# Patient Record
Sex: FEMALE | Race: WHITE | NOT HISPANIC OR LATINO | URBAN - METROPOLITAN AREA
[De-identification: names, ages, dates, MRNs, and addresses within clinical notes are randomized per-mention and may not be internally consistent; named-entity substitution may affect disease eponyms.]

---
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
## 2020-06-17 NOTE — ED PEDIATRIC NURSE NOTE - NS ED NURSE IV DC DT
- renal function is stable around baseline  - Your parathyroid hormone level is at target which is good ( the hormone that controls bone health and can be altered with renal disease)  - your hemoglobin levels are normal ( the measure of the strength of you
Result released to my chart.
Results sent to patient via my chart.
Sent to [facility].
28-Nov-2017 00:17

## 2020-06-22 ENCOUNTER — APPOINTMENT (OUTPATIENT)
Dept: ORTHOPEDIC SURGERY | Facility: CLINIC | Age: 20
End: 2020-06-22
Payer: COMMERCIAL

## 2020-06-22 VITALS — TEMPERATURE: 98.6 F

## 2020-06-22 VITALS — HEART RATE: 99 BPM | DIASTOLIC BLOOD PRESSURE: 81 MMHG | SYSTOLIC BLOOD PRESSURE: 123 MMHG

## 2020-06-22 DIAGNOSIS — M54.12 RADICULOPATHY, CERVICAL REGION: ICD-10-CM

## 2020-06-22 PROCEDURE — 72040 X-RAY EXAM NECK SPINE 2-3 VW: CPT

## 2020-06-22 PROCEDURE — 99214 OFFICE O/P EST MOD 30 MIN: CPT

## 2020-06-22 RX ORDER — CYCLOBENZAPRINE HYDROCHLORIDE 10 MG/1
10 TABLET, FILM COATED ORAL 3 TIMES DAILY
Qty: 60 | Refills: 0 | Status: ACTIVE | COMMUNITY
Start: 2020-06-22 | End: 1900-01-01

## 2020-06-22 RX ORDER — METHYLPREDNISOLONE 4 MG/1
4 TABLET ORAL
Qty: 1 | Refills: 0 | Status: ACTIVE | COMMUNITY
Start: 2020-06-22 | End: 1900-01-01

## 2020-06-22 NOTE — DISCUSSION/SUMMARY
[de-identified] : Given her progressive cervical radiculopathy an MRI of her cervical spine will be obtained.  We will also try Medrol Dosepak and Flexeril.  Follow-up after the MRI.

## 2020-06-22 NOTE — HISTORY OF PRESENT ILLNESS
[de-identified] : Ms. JABARI BERMUDEZ is a 19 year female who presents to office complaining of neck pain\par Pain is insidious in onset x 1 week without specific injury, trauma, or fall.\par She has history of asthma and she says that if she has an asthma attack, her neck will stiffen up and cause pain.\par Her pain is mainly resided in the neck, but at times radiates down her right arm into her elbow mainly, but at times into her fingers.\par She denies numbness or tingling sensations.\par Tylenol, Advil, and ice have been tried, with ice helping the most.\par She has not previously been evaluated for this problem by a medical professional.\par All review of systems, family history, social history, surgical history, past medical history, medications, and allergies not previously stated as positive are negative. They were reviewed by me today with the patient and documented accordingly.

## 2020-06-22 NOTE — PHYSICAL EXAM
[de-identified] : Examination of the cervical spine reveals no midline or paraspinal tenderness to palpation. No cervical lymphadenopathy. Decreased range of motion with respect to flexion, extension, rotation, and lateral bending.  Positive Spurlings. Negative Lhermitte's. Full range of motion bilateral shoulders without evidence of impingement. No instability of bilateral upper extremities.  Cranial nerves II through XII grossly intact. Intact sensation bilateral upper extremities. 5/5 deltoids biceps triceps wrist extensors wrist flexors finger flexors and hand intrinsics. 1+ biceps triceps and brachioradialis reflexes. Negative Lazar's. 2+ radial pulse. Negative Tinel's over the cubital and carpal tunnel. No skin lesions on the right and left upper extremities. [Ataxic] : not ataxic [de-identified] : AP lateral cervical x-rays reveals loss of cervical lordosis and some spondylosis

## 2020-07-06 ENCOUNTER — INPATIENT (INPATIENT)
Facility: HOSPITAL | Age: 20
LOS: 2 days | Discharge: HOME | DRG: 392 | End: 2020-07-09
Attending: INTERNAL MEDICINE | Admitting: HOSPITALIST
Payer: COMMERCIAL

## 2020-07-06 ENCOUNTER — APPOINTMENT (OUTPATIENT)
Dept: GASTROENTEROLOGY | Facility: CLINIC | Age: 20
End: 2020-07-06
Payer: COMMERCIAL

## 2020-07-06 VITALS
TEMPERATURE: 99 F | DIASTOLIC BLOOD PRESSURE: 69 MMHG | WEIGHT: 160.06 LBS | OXYGEN SATURATION: 100 % | HEIGHT: 63 IN | SYSTOLIC BLOOD PRESSURE: 105 MMHG | RESPIRATION RATE: 19 BRPM | HEART RATE: 103 BPM

## 2020-07-06 VITALS
OXYGEN SATURATION: 98 % | BODY MASS INDEX: 30.12 KG/M2 | SYSTOLIC BLOOD PRESSURE: 120 MMHG | WEIGHT: 170 LBS | DIASTOLIC BLOOD PRESSURE: 80 MMHG | HEART RATE: 99 BPM | HEIGHT: 63 IN

## 2020-07-06 VITALS — TEMPERATURE: 98.2 F

## 2020-07-06 DIAGNOSIS — R13.10 DYSPHAGIA, UNSPECIFIED: ICD-10-CM

## 2020-07-06 LAB
ALBUMIN SERPL ELPH-MCNC: 4.6 G/DL — SIGNIFICANT CHANGE UP (ref 3.3–5)
ALP SERPL-CCNC: 91 U/L — SIGNIFICANT CHANGE UP (ref 40–120)
ALT FLD-CCNC: 12 U/L — SIGNIFICANT CHANGE UP (ref 10–45)
ANION GAP SERPL CALC-SCNC: 16 MMOL/L — SIGNIFICANT CHANGE UP (ref 5–17)
APTT BLD: 29.3 SEC — SIGNIFICANT CHANGE UP (ref 27.5–35.5)
AST SERPL-CCNC: 14 U/L — SIGNIFICANT CHANGE UP (ref 10–40)
BASOPHILS # BLD AUTO: 0.05 K/UL — SIGNIFICANT CHANGE UP (ref 0–0.2)
BASOPHILS NFR BLD AUTO: 0.5 % — SIGNIFICANT CHANGE UP (ref 0–2)
BILIRUB SERPL-MCNC: 0.2 MG/DL — SIGNIFICANT CHANGE UP (ref 0.2–1.2)
BLD GP AB SCN SERPL QL: NEGATIVE — SIGNIFICANT CHANGE UP
BUN SERPL-MCNC: 10 MG/DL — SIGNIFICANT CHANGE UP (ref 7–23)
CALCIUM SERPL-MCNC: 9.8 MG/DL — SIGNIFICANT CHANGE UP (ref 8.4–10.5)
CHLORIDE SERPL-SCNC: 102 MMOL/L — SIGNIFICANT CHANGE UP (ref 96–108)
CO2 SERPL-SCNC: 20 MMOL/L — LOW (ref 22–31)
CREAT SERPL-MCNC: 0.91 MG/DL — SIGNIFICANT CHANGE UP (ref 0.5–1.3)
EOSINOPHIL # BLD AUTO: 0.02 K/UL — SIGNIFICANT CHANGE UP (ref 0–0.5)
EOSINOPHIL NFR BLD AUTO: 0.2 % — SIGNIFICANT CHANGE UP (ref 0–6)
GLUCOSE SERPL-MCNC: 80 MG/DL — SIGNIFICANT CHANGE UP (ref 70–99)
HCG SERPL-ACNC: <2 MIU/ML — SIGNIFICANT CHANGE UP
HCT VFR BLD CALC: 38.8 % — SIGNIFICANT CHANGE UP (ref 34.5–45)
HGB BLD-MCNC: 12.8 G/DL — SIGNIFICANT CHANGE UP (ref 11.5–15.5)
IMM GRANULOCYTES NFR BLD AUTO: 0.3 % — SIGNIFICANT CHANGE UP (ref 0–1.5)
INR BLD: 1.12 RATIO — SIGNIFICANT CHANGE UP (ref 0.88–1.16)
LYMPHOCYTES # BLD AUTO: 2.22 K/UL — SIGNIFICANT CHANGE UP (ref 1–3.3)
LYMPHOCYTES # BLD AUTO: 23.9 % — SIGNIFICANT CHANGE UP (ref 13–44)
MCHC RBC-ENTMCNC: 27.4 PG — SIGNIFICANT CHANGE UP (ref 27–34)
MCHC RBC-ENTMCNC: 33 GM/DL — SIGNIFICANT CHANGE UP (ref 32–36)
MCV RBC AUTO: 83.1 FL — SIGNIFICANT CHANGE UP (ref 80–100)
MONOCYTES # BLD AUTO: 0.45 K/UL — SIGNIFICANT CHANGE UP (ref 0–0.9)
MONOCYTES NFR BLD AUTO: 4.8 % — SIGNIFICANT CHANGE UP (ref 2–14)
NEUTROPHILS # BLD AUTO: 6.51 K/UL — SIGNIFICANT CHANGE UP (ref 1.8–7.4)
NEUTROPHILS NFR BLD AUTO: 70.3 % — SIGNIFICANT CHANGE UP (ref 43–77)
NRBC # BLD: 0 /100 WBCS — SIGNIFICANT CHANGE UP (ref 0–0)
PLATELET # BLD AUTO: 391 K/UL — SIGNIFICANT CHANGE UP (ref 150–400)
POTASSIUM SERPL-MCNC: 3.6 MMOL/L — SIGNIFICANT CHANGE UP (ref 3.5–5.3)
POTASSIUM SERPL-SCNC: 3.6 MMOL/L — SIGNIFICANT CHANGE UP (ref 3.5–5.3)
PROT SERPL-MCNC: 7.7 G/DL — SIGNIFICANT CHANGE UP (ref 6–8.3)
PROTHROM AB SERPL-ACNC: 12.8 SEC — SIGNIFICANT CHANGE UP (ref 10.6–13.6)
RBC # BLD: 4.67 M/UL — SIGNIFICANT CHANGE UP (ref 3.8–5.2)
RBC # FLD: 13 % — SIGNIFICANT CHANGE UP (ref 10.3–14.5)
RH IG SCN BLD-IMP: NEGATIVE — SIGNIFICANT CHANGE UP
RH IG SCN BLD-IMP: NEGATIVE — SIGNIFICANT CHANGE UP
SARS-COV-2 RNA SPEC QL NAA+PROBE: SIGNIFICANT CHANGE UP
SODIUM SERPL-SCNC: 138 MMOL/L — SIGNIFICANT CHANGE UP (ref 135–145)
WBC # BLD: 9.28 K/UL — SIGNIFICANT CHANGE UP (ref 3.8–10.5)
WBC # FLD AUTO: 9.28 K/UL — SIGNIFICANT CHANGE UP (ref 3.8–10.5)

## 2020-07-06 PROCEDURE — 71046 X-RAY EXAM CHEST 2 VIEWS: CPT | Mod: 26

## 2020-07-06 PROCEDURE — 99285 EMERGENCY DEPT VISIT HI MDM: CPT

## 2020-07-06 PROCEDURE — 99204 OFFICE O/P NEW MOD 45 MIN: CPT

## 2020-07-06 PROCEDURE — 99223 1ST HOSP IP/OBS HIGH 75: CPT

## 2020-07-06 RX ORDER — SODIUM CHLORIDE 9 MG/ML
1000 INJECTION, SOLUTION INTRAVENOUS ONCE
Refills: 0 | Status: COMPLETED | OUTPATIENT
Start: 2020-07-06 | End: 2020-07-06

## 2020-07-06 RX ADMIN — SODIUM CHLORIDE 1000 MILLILITER(S): 9 INJECTION, SOLUTION INTRAVENOUS at 20:51

## 2020-07-06 NOTE — ED CLERICAL - NS ED CLERK NOTE PRE-ARRIVAL INFORMATION; ADDITIONAL PRE-ARRIVAL INFORMATION
CC/Reason For referral: difficulty swallowing x 3 days  Preferred Consultant(if applicable): [facility]  Who admits for you (if needed): Hospitalist  Do you have documents you would like to fax over? Note in [employer]  Would you still like to speak to an ED attending? can call once patient is seen and evaluated

## 2020-07-06 NOTE — H&P ADULT - ASSESSMENT
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

## 2020-07-06 NOTE — ED PROVIDER NOTE - CLINICAL SUMMARY MEDICAL DECISION MAKING FREE TEXT BOX
19F pmh carole, CRPS p/w 3 days of inability to swallow liquids or solids. States every time she eats or drinks, she immediately vomits and feels pain on lower right side of chest. Able to swallow saliva. Pt went to Gastroenterologist who referred pt here for endoscopy.  Pt in NAD, Speaking clearly, no abd ttp.  given tachycardia, patient likely dehydrated from lack of PO intake.  Concern for possible food impaction vs achalasia.  Will check labs, give IV hydration and plan for admission.  - Amairani Huerta, DO

## 2020-07-06 NOTE — ED ADULT TRIAGE NOTE - CHIEF COMPLAINT QUOTE
patient c/o pain/ "feeling of something getting stuck" after eating or drinking x3days  seen by DARLYN Wynn and sent in for further workup / endoscopy   denies diff breathing/swallowing

## 2020-07-06 NOTE — H&P ADULT - NSICDXPASTMEDICALHX_GEN_ALL_CORE_FT
PAST MEDICAL HISTORY:  Anxiety     PANDAS (pediatric autoimmune neuropsychiatric disease associated with streptococcal infection)

## 2020-07-06 NOTE — H&P ADULT - ATTENDING COMMENTS
Patient was previously unknown to me. Patient was assigned to me by hospitalist in charge. My involvement in this case consisted only of the initial history, physical and management plan. Primary medicine day team to assume care in AM and thereafter. Case discussed in detail with overnight medicine NP/PA Patient was previously unknown to me. Patient was assigned to me by hospitalist in charge. My involvement in this case consisted only of the initial history, physical and management plan. Primary medicine day team to assume care in AM and thereafter. Case discussed in detail with overnight medicine NP/RADHA Mata [id number]

## 2020-07-06 NOTE — H&P ADULT - PROBLEM SELECTOR PLAN 2
Improved HR during assessment HR in 80s -110s with about 700 cc of LR infused  Likely in setting of dehydration  Monitor vitals

## 2020-07-06 NOTE — H&P ADULT - PROBLEM SELECTOR PLAN 3
IMPROVE score = 0  Encourage ambulation Patient on Prozac and Topamax at home  Will hold for now given inability to tolerate PO at this time

## 2020-07-06 NOTE — H&P ADULT - NSHPREVIEWOFSYSTEMS_GEN_ALL_CORE
REVIEW OF SYSTEMS:  CONSTITUTIONAL: See HPI  EYES: No eye pain or discharge  ENMT: No sinus or throat pain  RESPIRATORY: No cough or shortness of breath  CARDIOVASCULAR: No chest pain, palpitations, or leg swelling  GASTROINTESTINAL: See HPI  GENITOURINARY: No dysuria or change of frequency  NEUROLOGICAL: No headaches, loss of strength, numbness, or tremors  SKIN: No rashes or lesions   LYMPH NODES: No enlarged glands  ENDOCRINE: No heat or cold intolerance; No hair loss  MUSCULOSKELETAL: No joint pain or swelling; No muscle, back, or extremity pain  PSYCHIATRIC: No depression or anxiety  ALLERGY AND IMMUNOLOGIC: +Reaction to medications: see allergy list

## 2020-07-06 NOTE — H&P ADULT - PROBLEM SELECTOR PLAN 1
Dysphagia with component of odynophagia  Etiology includes but not limited to anatomical pathology (i.e stricture vs esophageal stricture) vs neurological related Dysphagia with component of odynophagia  Etiology includes but not limited to anatomical pathology, i.e stricture vs esophageal stricture vs achalasia vs esophagitis  NPO for now  Maintenance fluids  Check Depakote levels  Will convert Depakote to IV as patient states she was prescribed medication to control vomiting syndrome 2 years ago. (Discussed IV dosing with overnight pharmacist)

## 2020-07-06 NOTE — H&P ADULT - NSHPLABSRESULTS_GEN_ALL_CORE
Personally reviewed labs and noted in detail below Personally reviewed labs and noted in detail below:  no leukocytosis    Personally reviewed EKG: Sinus Tach 124 bpm QTc 439 no ST segment changes    Personally reviewed CXR: no appreciable focal consolidations

## 2020-07-06 NOTE — H&P ADULT - NSHPPHYSICALEXAM_GEN_ALL_CORE
Vital Signs Last 24 Hrs  T(C): 36.8 (06 Jul 2020 20:58), Max: 37.2 (06 Jul 2020 17:05)  T(F): 98.2 (06 Jul 2020 20:58), Max: 98.9 (06 Jul 2020 17:05)  HR: 117 (06 Jul 2020 20:58) (103 - 120)  BP: 112/79 (06 Jul 2020 20:58) (105/69 - 112/79)  BP(mean): --  RR: 18 (06 Jul 2020 20:58) (18 - 19)  SpO2: 100% (06 Jul 2020 20:58) (100% - 100%)    PHYSICAL EXAM:  GENERAL: NAD, well-groomed, well-developed  HEAD:  Atraumatic, Normocephalic  EYES: EOMI, PERRLA, conjunctiva and sclera clear  NECK: Supple, No JVD  NERVOUS SYSTEM:  Alert & Oriented X3, Good concentration; Motor Strength 5/5 B/L upper and lower extremities  CHEST/LUNG: Clear to percussion bilaterally; No rales, rhonchi, or wheezing  HEART: On monitor HR range from 80s-110s. Regular rhythm + S1 S2; No murmurs, rubs, or gallops  ABDOMEN: Soft, Nontender, Nondistended; Bowel sounds present  EXTREMITIES:  2+ Radial and PT pulses. No clubbing, cyanosis, or edema  LYMPH: No cervical lymphadenopathy noted  SKIN: Warm and dry. No rashes or lesions

## 2020-07-06 NOTE — H&P ADULT - PROBLEM SELECTOR PROBLEM 2
PANDAS (pediatric autoimmune neuropsychiatric disease associated with streptococcal infection) Sinus tachycardia

## 2020-07-06 NOTE — ED PROVIDER NOTE - OBJECTIVE STATEMENT
18 y/o F with pmhx of pandas CRPS presents to the ED c/o x3 days of inability to swallow liquids or solids. States every time she eats or drinks, she immediately vomits and feel pain on lower right side of chest. Able to swallow saliva. Pt went to Gastroenterologist who referred pt here for endoscopy. Denies sob, abd pain, diarrhea, or constipation. 18 y/o F with pmhx of carole, CRPS presents to the ED c/o x3 days of inability to swallow liquids or solids. States every time she eats or drinks, she immediately vomits and feel pain on lower right side of chest. Able to swallow saliva. Pt went to Gastroenterologist who referred pt here for endoscopy. Denies sob, abd pain, diarrhea, or constipation.

## 2020-07-06 NOTE — PHYSICAL EXAM
[General Appearance - Alert] : alert [General Appearance - In No Acute Distress] : in no acute distress [General Appearance - Well Nourished] : well nourished [General Appearance - Well Developed] : well developed [General Appearance - Well-Appearing] : healthy appearing [Neck Appearance] : the appearance of the neck was normal [Neck Cervical Mass (___cm)] : no neck mass was observed [Jugular Venous Distention Increased] : there was no jugular-venous distention [Thyroid Nodule] : there were no palpable thyroid nodules [Thyroid Diffuse Enlargement] : the thyroid was not enlarged [Auscultation Breath Sounds / Voice Sounds] : lungs were clear to auscultation bilaterally [Heart Rate And Rhythm] : heart rate was normal and rhythm regular [Heart Sounds] : normal S1 and S2 [Heart Sounds Gallop] : no gallops [Murmurs] : no murmurs [Bowel Sounds] : normal bowel sounds [Heart Sounds Pericardial Friction Rub] : no pericardial rub [Abdomen Soft] : soft [Abdomen Tenderness] : non-tender [] : no hepato-splenomegaly [Abdomen Mass (___ Cm)] : no abdominal mass palpated

## 2020-07-06 NOTE — H&P ADULT - HISTORY OF PRESENT ILLNESS
18 yo woman with PMH of PANDAS, CRPS presents from gastroenterologist office in the setting of inability to swallow solids and liquids with resulting vomiting the past 3 days. Patient states that when she swallows she feels a sharp discomfort in the right/middle part of the chest. Sometimes it feels like there is a "stuck" feeling. Only occurs when swallowing. Denies pain without PO intake. Patient denies abdominal pain or nausea, diarrhea or constipation.  Denies fevers, chills, sweats.     She reports 2 years ago she was experiencing nausea/vomiting with a "cyclical" pattern which is much different from her current presentation.  At that time she was seen by many specialists (including a pediatric neurologist at children's [facility] in [place] and it was thought at that time her symptoms were 2/2 to a neurological etiology and was placed on Depakote and Topamax (patient states that she was not given a formal diagnosis at that time). She was had an EGD 2 years ago which was reported to be normal. Patient has been compliant with her medication with the exception the past 3 days in setting of emesis. 18 yo woman with PMH of VONDA, EDWARS presents from gastroenterologist office in the setting of inability to swallow solids and liquids with resulting vomiting the past 3 days. Patient states that when she swallows she feels a sharp discomfort in the right/middle part of the chest. Sometimes it feels like there is a "stuck" feeling. Only occurs when swallowing. Denies any pooling of saliva: Can tolerate swallowing saliva. Denies pain without PO intake. Patient denies abdominal pain or nausea, diarrhea or constipation.  Denies fevers, chills, sweats.     She reports 2 years ago she was experiencing nausea/vomiting with a "cyclical" pattern which is much different from her current presentation.  At that time she was seen by many specialists (including a pediatric [facility] in [place] and it was thought at that time her symptoms were 2/2 to a neurological etiology and was placed on Depakote and Topamax (patient states that she was not given a formal diagnosis at that time). She was had an EGD 2 years ago which was reported to be normal. Patient has been compliant with her medication with the exception the past 3 days in setting of emesis.

## 2020-07-06 NOTE — H&P ADULT - PROBLEM SELECTOR PROBLEM 3
Prophylactic measure PANDAS (pediatric autoimmune neuropsychiatric disease associated with streptococcal infection)

## 2020-07-06 NOTE — ED PROVIDER NOTE - PHYSICAL EXAMINATION
GEN: NAD, awake, eyes open spontaneously  HEENT: NCAT, MMM, Trachea midline, normal conjunctiva, perrl, oropharynx clear   CHEST/LUNGS: Non-tachypneic, CTAB, bilateral breath sounds  CARDIAC: tachycardic, no murmurs, no rubs, no gallops   ABDOMEN: Soft, NTND, No rebound/guarding  MSK: No lower extremity edema   SKIN: No rashes, no petechiae, no vesicles  NEURO: CN grossly intact, normal coordination, no focal motor or sensory deficits  PSYCH: Alert, appropriate, cooperative, with capacity and insight

## 2020-07-06 NOTE — ED ADULT NURSE NOTE - OBJECTIVE STATEMENT
· 19y F aaox4 ambulatory fom home , presents to Ed c/o inability to swallow , as per pt states 3 days ago she woke up and have inability to eat food, drink, solid , reports every time she eats she vomit , accompanying w/ cp. Went to see a gastroenterologists and advices to come to ED for evaluation, hydration, possible endoscopy, denies SOB, CP, fever, chills, palpitations,  issues, weakness, dizziness, ha.

## 2020-07-07 ENCOUNTER — RESULT REVIEW (OUTPATIENT)
Age: 20
End: 2020-07-07

## 2020-07-07 DIAGNOSIS — Z29.9 ENCOUNTER FOR PROPHYLACTIC MEASURES, UNSPECIFIED: ICD-10-CM

## 2020-07-07 DIAGNOSIS — D89.89 OTHER SPECIFIED DISORDERS INVOLVING THE IMMUNE MECHANISM, NOT ELSEWHERE CLASSIFIED: ICD-10-CM

## 2020-07-07 DIAGNOSIS — R00.0 TACHYCARDIA, UNSPECIFIED: ICD-10-CM

## 2020-07-07 DIAGNOSIS — R13.10 DYSPHAGIA, UNSPECIFIED: ICD-10-CM

## 2020-07-07 LAB
ANION GAP SERPL CALC-SCNC: 11 MMOL/L — SIGNIFICANT CHANGE UP (ref 5–17)
BASOPHILS # BLD AUTO: 0.04 K/UL — SIGNIFICANT CHANGE UP (ref 0–0.2)
BASOPHILS NFR BLD AUTO: 0.5 % — SIGNIFICANT CHANGE UP (ref 0–2)
BUN SERPL-MCNC: 8 MG/DL — SIGNIFICANT CHANGE UP (ref 7–23)
CALCIUM SERPL-MCNC: 9.2 MG/DL — SIGNIFICANT CHANGE UP (ref 8.4–10.5)
CHLORIDE SERPL-SCNC: 106 MMOL/L — SIGNIFICANT CHANGE UP (ref 96–108)
CO2 SERPL-SCNC: 22 MMOL/L — SIGNIFICANT CHANGE UP (ref 22–31)
CREAT SERPL-MCNC: 0.85 MG/DL — SIGNIFICANT CHANGE UP (ref 0.5–1.3)
EOSINOPHIL # BLD AUTO: 0.09 K/UL — SIGNIFICANT CHANGE UP (ref 0–0.5)
EOSINOPHIL NFR BLD AUTO: 1.2 % — SIGNIFICANT CHANGE UP (ref 0–6)
GLUCOSE SERPL-MCNC: 80 MG/DL — SIGNIFICANT CHANGE UP (ref 70–99)
HCT VFR BLD CALC: 37.1 % — SIGNIFICANT CHANGE UP (ref 34.5–45)
HGB BLD-MCNC: 11.9 G/DL — SIGNIFICANT CHANGE UP (ref 11.5–15.5)
IMM GRANULOCYTES NFR BLD AUTO: 0.3 % — SIGNIFICANT CHANGE UP (ref 0–1.5)
LYMPHOCYTES # BLD AUTO: 2.93 K/UL — SIGNIFICANT CHANGE UP (ref 1–3.3)
LYMPHOCYTES # BLD AUTO: 40 % — SIGNIFICANT CHANGE UP (ref 13–44)
MAGNESIUM SERPL-MCNC: 2.2 MG/DL — SIGNIFICANT CHANGE UP (ref 1.6–2.6)
MCHC RBC-ENTMCNC: 27 PG — SIGNIFICANT CHANGE UP (ref 27–34)
MCHC RBC-ENTMCNC: 32.1 GM/DL — SIGNIFICANT CHANGE UP (ref 32–36)
MCV RBC AUTO: 84.1 FL — SIGNIFICANT CHANGE UP (ref 80–100)
MONOCYTES # BLD AUTO: 0.47 K/UL — SIGNIFICANT CHANGE UP (ref 0–0.9)
MONOCYTES NFR BLD AUTO: 6.4 % — SIGNIFICANT CHANGE UP (ref 2–14)
NEUTROPHILS # BLD AUTO: 3.78 K/UL — SIGNIFICANT CHANGE UP (ref 1.8–7.4)
NEUTROPHILS NFR BLD AUTO: 51.6 % — SIGNIFICANT CHANGE UP (ref 43–77)
NRBC # BLD: 0 /100 WBCS — SIGNIFICANT CHANGE UP (ref 0–0)
PHOSPHATE SERPL-MCNC: 3.6 MG/DL — SIGNIFICANT CHANGE UP (ref 2.5–4.5)
PLATELET # BLD AUTO: 380 K/UL — SIGNIFICANT CHANGE UP (ref 150–400)
POTASSIUM SERPL-MCNC: 4.2 MMOL/L — SIGNIFICANT CHANGE UP (ref 3.5–5.3)
POTASSIUM SERPL-SCNC: 4.2 MMOL/L — SIGNIFICANT CHANGE UP (ref 3.5–5.3)
RBC # BLD: 4.41 M/UL — SIGNIFICANT CHANGE UP (ref 3.8–5.2)
RBC # FLD: 12.8 % — SIGNIFICANT CHANGE UP (ref 10.3–14.5)
SODIUM SERPL-SCNC: 139 MMOL/L — SIGNIFICANT CHANGE UP (ref 135–145)
VALPROATE SERPL-MCNC: <3 UG/ML — LOW (ref 50–100)
WBC # BLD: 7.33 K/UL — SIGNIFICANT CHANGE UP (ref 3.8–10.5)
WBC # FLD AUTO: 7.33 K/UL — SIGNIFICANT CHANGE UP (ref 3.8–10.5)

## 2020-07-07 PROCEDURE — 43239 EGD BIOPSY SINGLE/MULTIPLE: CPT

## 2020-07-07 PROCEDURE — 88312 SPECIAL STAINS GROUP 1: CPT | Mod: 26

## 2020-07-07 PROCEDURE — 99223 1ST HOSP IP/OBS HIGH 75: CPT | Mod: 25

## 2020-07-07 PROCEDURE — 99233 SBSQ HOSP IP/OBS HIGH 50: CPT

## 2020-07-07 PROCEDURE — 88305 TISSUE EXAM BY PATHOLOGIST: CPT | Mod: 26

## 2020-07-07 RX ORDER — ONDANSETRON 8 MG/1
4 TABLET, FILM COATED ORAL EVERY 6 HOURS
Refills: 0 | Status: DISCONTINUED | OUTPATIENT
Start: 2020-07-07 | End: 2020-07-07

## 2020-07-07 RX ORDER — VALPROIC ACID (AS SODIUM SALT) 250 MG/5ML
42 SOLUTION, ORAL ORAL EVERY 6 HOURS
Refills: 0 | Status: DISCONTINUED | OUTPATIENT
Start: 2020-07-07 | End: 2020-07-07

## 2020-07-07 RX ORDER — SODIUM CHLORIDE 9 MG/ML
500 INJECTION, SOLUTION INTRAVENOUS
Refills: 0 | Status: DISCONTINUED | OUTPATIENT
Start: 2020-07-07 | End: 2020-07-08

## 2020-07-07 RX ORDER — ONDANSETRON 8 MG/1
4 TABLET, FILM COATED ORAL EVERY 6 HOURS
Refills: 0 | Status: DISCONTINUED | OUTPATIENT
Start: 2020-07-07 | End: 2020-07-09

## 2020-07-07 RX ORDER — VALPROIC ACID (AS SODIUM SALT) 250 MG/5ML
62.5 SOLUTION, ORAL ORAL
Refills: 0 | Status: DISCONTINUED | OUTPATIENT
Start: 2020-07-07 | End: 2020-07-09

## 2020-07-07 RX ORDER — SODIUM CHLORIDE 9 MG/ML
1000 INJECTION, SOLUTION INTRAVENOUS
Refills: 0 | Status: COMPLETED | OUTPATIENT
Start: 2020-07-07 | End: 2020-07-07

## 2020-07-07 RX ADMIN — SODIUM CHLORIDE 100 MILLILITER(S): 9 INJECTION, SOLUTION INTRAVENOUS at 20:48

## 2020-07-07 RX ADMIN — Medication 25 MILLIGRAM(S): at 05:20

## 2020-07-07 RX ADMIN — SODIUM CHLORIDE 100 MILLILITER(S): 9 INJECTION, SOLUTION INTRAVENOUS at 00:31

## 2020-07-07 RX ADMIN — Medication 25 MILLIGRAM(S): at 18:48

## 2020-07-07 RX ADMIN — Medication 25 MILLIGRAM(S): at 13:52

## 2020-07-07 NOTE — PROGRESS NOTE ADULT - SUBJECTIVE AND OBJECTIVE BOX
Pre-Endoscopy Evaluation      Referring Physician: dr. malloy                                Procedure:  upper gastrointestinal endoscopy     Indication for Procedure: dysphagia    Pertinent History: 19 year old female with PMH of PANDAS and CRPS sent in by primary GI due to inability to tolerate solids and liquids with reported vomiting       PAST MEDICAL & SURGICAL HISTORY:  PANDAS (pediatric autoimmune neuropsychiatric disease associated with streptococcal infection)  Anxiety  No significant past surgical history      PMH of Gastroparesis [ ]  Gastric Surgery [ ]  Gastric Outlet Obstruction [ ]    Allergies:    Compazine (Angioedema; Dystonic RXN; Anaphylaxis)  metoclopramide (Angioedema; Dystonic RXN; Anaphylaxis)    Intolerances:    Latex allergy: [ ] yes [X] no    Medications:MEDICATIONS  (STANDING):  valproate sodium IVPB 62.5 milliGRAM(s) IV Intermittent <User Schedule>    MEDICATIONS  (PRN):  ondansetron Injectable 4 milliGRAM(s) IV Push every 6 hours PRN Nausea and/or Vomiting      Smoking: [ ] yes  [X] no    AICD/PPM: [ ] yes   [X] no    Pertinent lab data:                        11.9   7.33  )-----------( 380      ( 07 Jul 2020 07:11 )             37.1     07-07    139  |  106  |  8   ----------------------------<  80  4.2   |  22  |  0.85    Ca    9.2      07 Jul 2020 07:10  Phos  3.6     07-07  Mg     2.2     07-07    TPro  7.7  /  Alb  4.6  /  TBili  0.2  /  DBili  x   /  AST  14  /  ALT  12  /  AlkPhos  91  07-06    PT/INR - ( 06 Jul 2020 21:03 )   PT: 12.8 sec;   INR: 1.12 ratio         PTT - ( 06 Jul 2020 21:03 )  PTT:29.3 sec      HCG Quantitative, Serum: <2.0 mIU/mL (07-06 @ 21:03)    COVID-19 PCR: NotDetec (06 Jul 2020 21:13)      Physical Examination:  Daily Height in cm: 160.02 (06 Jul 2020 17:05)    Daily   Vital Signs Last 24 Hrs  T(C): 36.6 (07 Jul 2020 09:48), Max: 37.2 (06 Jul 2020 17:05)  T(F): 97.8 (07 Jul 2020 09:48), Max: 98.9 (06 Jul 2020 17:05)  HR: 104 (07 Jul 2020 09:48) (85 - 120)  BP: 115/58 (07 Jul 2020 09:48) (104/72 - 116/80)  BP(mean): --  RR: 18 (07 Jul 2020 09:48) (16 - 19)  SpO2: 99% (07 Jul 2020 09:48) (98% - 100%)    Drug Dosing Weight  Height (cm): 160.02 (06 Jul 2020 17:05)  Weight (kg): 72.6 (06 Jul 2020 17:05)  BMI (kg/m2): 28.4 (06 Jul 2020 17:05)  BSA (m2): 1.76 (06 Jul 2020 17:05)    Constitutional: NAD       Neck:  No JVD    Respiratory: CTAB/L    Cardiovascular: S1 and S2    Gastrointestinal: BS+, soft, NT/ND    Extremities: No peripheral edema    Comments:      The patient is a suitable candidate for the planned procedure unless box checked [ ]  No, explain:

## 2020-07-07 NOTE — PROGRESS NOTE ADULT - PROBLEM SELECTOR PLAN 1
- s/p egd-- unremarkable, biopsies sent  - advance diet to soft  - resume home meds PO - s/p egd-- with sliding hernia, biopsies sent to r/o eosonophilic esophagitis   - c/w clear liquid diet (patient did not tolerate this-- she vomited pudding)   - c/w iv depakene; hold prozac and topamax as they are not available IV  - GI following; will preemptively make npo after MN

## 2020-07-07 NOTE — CONSULT NOTE ADULT - ATTENDING COMMENTS
20 yo F pmh VONDA, chronic pain syndrome presents 3 days after potential food impaction. Suddenly felt something stuck, brought back up.  Now difficulty swallowing both solids and liquids x 3 days with some component of odynophagia.  This has never happened before.  Ddx: esophageal ulcer 2/2 impaction, infectious esophagitis, dysmotility, residual globus.  Plan for endoscopic evaluation today.  Rest of workup pends this.

## 2020-07-07 NOTE — CONSULT NOTE ADULT - ASSESSMENT
Impression:    18 y/o F w/ hx of PANDAS and CRPS sent in by primary GI due to inability to tolerate solids and liquids with reported vomiting x 3 days.    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 Impression:    20 y/o F w/ hx of PANDAS and CRPS sent in by primary GI due to inability to tolerate solids and liquids with reported vomiting x 3 days.    # Inability to tolerate PO intake: Endorses globus sensation, but able to tolerate small amounts of liquids over past few days. May represent anatomical abnormality. May also represent motility disorder or gastroparesis.  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

## 2020-07-07 NOTE — ASSESSMENT
[FreeTextEntry1] : \par Near complete inability to swallow\par \par Is keeping down secretions, but unable to drink more than a sip of water, and no food, for the last 3 days\par \par Perhaps has a stricture - with acute symptoms related to an esophageal impaction?\par Perhaps she has pill esophagitis?\par She points to upper chest and says the rice had settled there before vomiting - ?c/w diverticulum\par \par WIll need to go to ER, IVF\par EGD

## 2020-07-07 NOTE — PROGRESS NOTE ADULT - PROBLEM SELECTOR PLAN 2
Likely in setting of dehydration  Monitor vitals - 2' dehydration; still tachycardic  - will start  cc/hr x 1 day

## 2020-07-07 NOTE — PROGRESS NOTE ADULT - ASSESSMENT
20 yo woman with PMH of PANDAS, CRPS presents from gastroenterologist office in the setting of inability to swallow solids and liquids with resulting vomiting the past 3 days

## 2020-07-07 NOTE — CONSULT NOTE ADULT - SUBJECTIVE AND OBJECTIVE BOX
Chief Complaint: Nausea/vomiting    HPI:    20 y/o F w/ hx of PANDAS and CRPS sent in by primary GI due to inability to tolerate solids and liquids with reported vomiting x 3 days.     The patient endorses feeling a sharp pain whenever attempting to swallow both solids and liquids, starting three days ago. She endorses globus sensation in the center of her chest. She denies nausea and abdominal pain. She denies diarrhea and constipation.    The patient endorses cyclical vomiting approximately 2 years ago, however, her current presentation is different. Per chart review, her symptoms were attributed to an underlying neurological etiology and she was started on valproic acid and topiramate.     The patient reportedly had an EGD 2 years ago which she reports was normal.    Allergies:  Compazine (Angioedema; Dystonic RXN; Anaphylaxis)  metoclopramide (Angioedema; Dystonic RXN; Anaphylaxis)    Home Medications:    · 	PROzac: Last Dose Taken:  , 60 milligram(s) orally once a day  · 	Depakote  mg oral tablet, extended release: Last Dose Taken:  , 1 tab(s) orally every other day  · 	Topamax 25 mg oral tablet: Last Dose Taken:  , 1 tab(s) orally once a day  · 	Topamax 50 mg oral tablet: Last Dose Taken:  , 1 tab(s) orally once a day (at bedtime), As Needed  · 	JUNEL FE     TAB 1.5/30: Last Dose Taken:    Hospital Medications:  ondansetron Injectable 4 milliGRAM(s) IV Push every 6 hours PRN  valproate sodium IVPB 62.5 milliGRAM(s) IV Intermittent <User Schedule>    PMHX/PSHX:  PANDAS (pediatric autoimmune neuropsychiatric disease associated with streptococcal infection)  Anxiety    Family history:     Denies family history of colon cancer/polyps, stomach cancer/polyps, pancreatic cancer/masses, liver cancer/disease, ovarian cancer and endometrial cancer.    Social History:     Tob: Denies  EtOH: Denies  Illicit Drugs: Denies    ROS:     General:  No wt loss, fevers, chills, night sweats, fatigue  Eyes:  Good vision, no reported pain  ENT:  No sore throat, pain, runny nose, + dysphagia  CV:  No pain, palpitations, hypo/hypertension  Pulm:  No dyspnea, cough, tachypnea, wheezing  GI:  No pain, No nausea, + vomiting, No diarrhea, No constipation, No weight loss, No fever, No pruritis, No rectal bleeding, No tarry stools, + dysphagia, + odynophagia  :  No pain, bleeding, incontinence, nocturia  Muscle:  No pain, weakness  Neuro:  No weakness, tingling, memory problems  Psych:  No fatigue, insomnia, mood problems, depression  Endocrine:  No polyuria, polydipsia, cold/heat intolerance  Heme:  No petechiae, ecchymosis, easy bruisability  Skin:  No rash, tattoos, scars, edema    PHYSICAL EXAM:     Vital Signs:  Vital Signs Last 24 Hrs  T(C): 36.6 (07 Jul 2020 05:54), Max: 37.2 (06 Jul 2020 17:05)  T(F): 97.8 (07 Jul 2020 05:54), Max: 98.9 (06 Jul 2020 17:05)  HR: 85 (07 Jul 2020 05:54) (85 - 120)  BP: 113/76 (07 Jul 2020 05:54) (104/72 - 116/80)  BP(mean): --  RR: 18 (07 Jul 2020 05:54) (16 - 19)  SpO2: 98% (07 Jul 2020 05:54) (98% - 100%)  Daily Height in cm: 160.02 (06 Jul 2020 17:05)      GENERAL:  No acute distress  HEENT:  Normocephalic/atraumatic, no scleral icterus  CHEST:  Normal effort, no accessory muscle use  HEART:  Regular rate and rhythm, no murmurs/rubs/gallops  ABDOMEN:  Soft, non-tender, non-distended, normoactive bowel sounds  EXTREMITIES: No cyanosis, clubbing, or edema  SKIN:  No rash/erythema  NEURO:  Alert and oriented x 3    LABS:                        11.9   7.33  )-----------( 380      ( 07 Jul 2020 07:11 )             37.1     Mean Cell Volume: 84.1 fl (07-07-20 @ 07:11)    07-07    139  |  106  |  8   ----------------------------<  80  4.2   |  22  |  0.85    Ca    9.2      07 Jul 2020 07:10  Phos  3.6     07-07  Mg     2.2     07-07    TPro  7.7  /  Alb  4.6  /  TBili  0.2  /  DBili  x   /  AST  14  /  ALT  12  /  AlkPhos  91  07-06    LIVER FUNCTIONS - ( 06 Jul 2020 21:03 )  Alb: 4.6 g/dL / Pro: 7.7 g/dL / ALK PHOS: 91 U/L / ALT: 12 U/L / AST: 14 U/L / GGT: x           PT/INR - ( 06 Jul 2020 21:03 )   PT: 12.8 sec;   INR: 1.12 ratio      PTT - ( 06 Jul 2020 21:03 )  PTT:29.3 sec               11.9   7.33  )-----------( 380      ( 07 Jul 2020 07:11 )             37.1                         12.8   9.28  )-----------( 391      ( 06 Jul 2020 21:03 )             38.8     Imaging:    Reviewed

## 2020-07-07 NOTE — PROGRESS NOTE ADULT - ATTENDING COMMENTS
discharge today, if tolerates diet-- discussed with NP April [id number]    1 hour spent in preparation of discharge    Stormy Bianchi D.O.  Hospitalist Pager # [phone] will need GI f/u as patient still symptomatic  discussed with mother at bedside, ANUJA Tillman D.O.  Hospitalist Pager # [phone]

## 2020-07-07 NOTE — PROGRESS NOTE ADULT - SUBJECTIVE AND OBJECTIVE BOX
Patient with pain with swallowing liquids and solids. No new other complaints.    GENERAL: No fevers, no chills.  EYES: No blurry vision,  No photophobia  ENT: No sore throat.  No dysphagia  Cardiovascular: No chest pain, palpitations, orthopnea  Pulmonary: No cough, no wheezing. No shortness of breath  Gastrointestinal: +odynophagia, No abdominal pain, no diarrhea, no constipation.  No melena.  No hematochezia  Musculoskeletal: No weakness.  No myalgias.  Dermatology:  No rashes.  Neuro: No Headache.  No vertigo.  No dizziness.  Psych: No anxiety, no depression.  Denies suicidal thoughts.    MEDICATIONS  (STANDING):  valproate sodium IVPB 62.5 milliGRAM(s) IV Intermittent <User Schedule>    MEDICATIONS  (PRN):  ondansetron Injectable 4 milliGRAM(s) IV Push every 6 hours PRN Nausea and/or Vomiting    Vital Signs Last 24 Hrs  T(C): 36.9 (07 Jul 2020 14:06), Max: 37.2 (06 Jul 2020 17:05)  T(F): 98.4 (07 Jul 2020 14:06), Max: 98.9 (06 Jul 2020 17:05)  HR: 104 (07 Jul 2020 09:48) (85 - 120)  BP: 109/70 (07 Jul 2020 14:06) (104/72 - 116/80)  BP(mean): --  RR: 18 (07 Jul 2020 14:06) (16 - 19)  SpO2: 99% (07 Jul 2020 14:06) (98% - 100%)    GENERAL: NAD  HEAD:  Atraumatic, Normocephalic  EYES: EOMI, PERRLA, conjunctiva and sclera clear  ENT: Pharynx not erythematous  PULMONARY: Clear to auscultation bilaterally; No wheeze  CARDIOVASCULAR: Regular rate and rhythm; No murmurs, rubs, or gallops  ABDOMEN: Soft, Nontender, Nondistended; Bowel sounds present  EXTREMITIES:  2+ Peripheral Pulses, No clubbing, cyanosis, or edema  MUSCULOSKELETAL: No calf tenderness  PSYCH: AAOx3, normal affect  SKIN: warm and dry, No rashes or lesions    .  LABS:                         11.9   7.33  )-----------( 380      ( 07 Jul 2020 07:11 )             37.1     07-07    139  |  106  |  8   ----------------------------<  80  4.2   |  22  |  0.85    Ca    9.2      07 Jul 2020 07:10  Phos  3.6     07-07  Mg     2.2     07-07    TPro  7.7  /  Alb  4.6  /  TBili  0.2  /  DBili  x   /  AST  14  /  ALT  12  /  AlkPhos  91  07-06    PT/INR - ( 06 Jul 2020 21:03 )   PT: 12.8 sec;   INR: 1.12 ratio         PTT - ( 06 Jul 2020 21:03 )  PTT:29.3 sec          RADIOLOGY, EKG & ADDITIONAL TESTS: Reviewed.

## 2020-07-07 NOTE — HISTORY OF PRESENT ILLNESS
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 Diana\par \par Here today because she cannot swallow for 3 days\par 3 days ago, she awoke at 12 noon\par while swallowing rice had acute pain in right chest\par kept eating - and then  20 minutes later vomited all the rice and then liquid\par Has not been able to swallow since\par Cannot take any food - even yogurt; Can take maybe a sip of water but then vomits\par \par Had been on a zpak 10 days before - had finished a week ago (prescribed by pulmonologist for mycoplasma)\par \par has had GERD once in a blue moon, has taken TUMS\par No weight loss\par \par

## 2020-07-08 PROCEDURE — 99233 SBSQ HOSP IP/OBS HIGH 50: CPT

## 2020-07-08 PROCEDURE — 99232 SBSQ HOSP IP/OBS MODERATE 35: CPT

## 2020-07-08 RX ORDER — SODIUM CHLORIDE 9 MG/ML
500 INJECTION, SOLUTION INTRAVENOUS
Refills: 0 | Status: DISCONTINUED | OUTPATIENT
Start: 2020-07-08 | End: 2020-07-09

## 2020-07-08 RX ADMIN — SODIUM CHLORIDE 100 MILLILITER(S): 9 INJECTION, SOLUTION INTRAVENOUS at 14:23

## 2020-07-08 NOTE — PROGRESS NOTE ADULT - PROBLEM SELECTOR PLAN 1
- s/p egd-- with sliding hernia, biopsies sent to r/o eosonophilic esophagitis   - c/w clear liquid diet (patient did not tolerate this-- she vomited pudding)   - c/w iv depakene; hold prozac and topamax as they are not available IV  - gastric emptying study ordered  - GI following- I paged GI at  and

## 2020-07-08 NOTE — PROGRESS NOTE ADULT - SUBJECTIVE AND OBJECTIVE BOX
Chief Complaint: Nausea/vomiting  Reason for consult: Nausea/vomiting    Interval Events:   - The patient underwent upper endoscopy yesterday (Please see full report in Results section of [facility])  - The patient endorses multiple episodes of vomiting yesterday evening after drinking liquids and having some pudding    Allergies:  Compazine (Angioedema; Dystonic RXN; Anaphylaxis)  metoclopramide (Angioedema; Dystonic RXN; Anaphylaxis)    Hospital Medications:  lactated ringers. 500 milliLiter(s) IV Continuous <Continuous>  ondansetron Injectable 4 milliGRAM(s) IV Push every 6 hours PRN  valproate sodium IVPB 62.5 milliGRAM(s) IV Intermittent <User Schedule>    PMHX/PSHX:  PANDAS (pediatric autoimmune neuropsychiatric disease associated with streptococcal infection)  Anxiety    Family history:  No pertinent family history in first degree relatives    ROS:     General:  No wt loss, fevers, chills, night sweats, fatigue,   Eyes:  Good vision, no reported pain  ENT:  No sore throat, pain, runny nose, dysphagia  CV:  No pain, palpitations, hypo/hypertension  Pulm:  No dyspnea, cough, tachypnea, wheezing  GI:  No pain, + nausea, + vomiting, No diarrhea, No constipation, No weight loss, No fever, No pruritis, No rectal bleeding, No tarry stools, No dysphagia  :  No pain, bleeding, incontinence, nocturia  Muscle:  No pain, weakness  Neuro:  No weakness, tingling, memory problems  Psych:  No fatigue, insomnia, mood problems, depression  Endocrine:  No polyuria, polydipsia, cold/heat intolerance  Heme:  No petechiae, ecchymosis, easy bruisability  Skin:  No rash, tattoos, scars, edema    PHYSICAL EXAM:   Vital Signs:  Vital Signs Last 24 Hrs  T(C): 36.7 (08 Jul 2020 09:29), Max: 36.9 (07 Jul 2020 14:06)  T(F): 98 (08 Jul 2020 09:29), Max: 98.4 (07 Jul 2020 14:06)  HR: 95 (08 Jul 2020 09:29) (78 - 100)  BP: 112/67 (08 Jul 2020 09:29) (109/70 - 136/97)  BP(mean): --  RR: 18 (08 Jul 2020 09:29) (18 - 18)  SpO2: 99% (08 Jul 2020 09:29) (97% - 99%)    GENERAL:  No acute distress  HEENT:  Normocephalic/atraumtic,  no scleral icterus  CHEST:  Normal effort, no accessory muscle use  ABDOMEN:  Soft, non-tender, non-distended, normoactive bowel sounds  EXTREMITIES:  No cyanosis, clubbing, or edema  SKIN:  No rash/erythema  NEURO:  Alert and oriented x 3    LABS:                        11.9   7.33  )-----------( 380      ( 07 Jul 2020 07:11 )             37.1       07-07    139  |  106  |  8   ----------------------------<  80  4.2   |  22  |  0.85    Ca    9.2      07 Jul 2020 07:10  Phos  3.6     07-07  Mg     2.2     07-07    TPro  7.7  /  Alb  4.6  /  TBili  0.2  /  DBili  x   /  AST  14  /  ALT  12  /  AlkPhos  91  07-06    LIVER FUNCTIONS - ( 06 Jul 2020 21:03 )  Alb: 4.6 g/dL / Pro: 7.7 g/dL / ALK PHOS: 91 U/L / ALT: 12 U/L / AST: 14 U/L / GGT: x           PT/INR - ( 06 Jul 2020 21:03 )   PT: 12.8 sec;   INR: 1.12 ratio      PTT - ( 06 Jul 2020 21:03 )  PTT:29.3 sec               11.9   7.33  )-----------( 380      ( 07 Jul 2020 07:11 )             37.1                         12.8   9.28  )-----------( 391      ( 06 Jul 2020 21:03 )             38.8     Imaging:    Reviewed    Procedures:    < from: Upper Endoscopy (07.07.20 @ 10:12) >    [facility]  ____________________________________________________________________________________________________  Patient Name: Arabella Valdes                      MRN: [id number]  Account Number: [id number]                     YOB: 2000  Room: Endoscopy Room 1                           Gender: Female  Attending MD: Shorty Molina ,                  Procedure Date No Time: 7/7/2020  ____________________________________________________________________________________________________     Procedure:           Upper GI endoscopy  Indications:         Dysphagia  Providers:           Rc Peng (Fellow)  Medicines:           Monitored Anesthesia Care  Complications:       No immediate complications. Estimated blood loss: Minimal.  ____________________________________________________________________________________________________  Procedure:           Pre-Anesthesia Assessment:                       - Universal Protocol:           - Pre-procedure Verification: Prior to the procedure, the patient's identity                        was verified by full name, date of birth and medical record number. The                        patient's identity was verified on all pertinent medical records, including                        History and Physical, nursing assessment and pre-anesthesia assessment. Also                        prior to the procedure, a History and Physical was performed, and patient                        medications, allergies and sensitivities were reviewed. The patient's                        tolerance of previous anesthesia was reviewed. The risks and benefits of the                        procedure and the sedation options and risks were discussedwith the patient.                        All questions were answered and informed consent was obtained.                       - Marking: The endoscopic procedure was visually marked on a patient wrist                        band delineating the patient name, proposed procedure and endoscopist's                        initials.                       - Time-Out: Prior to the start of the procedure, the patient's                        identification, proposed procedure, accurate signed consent, correctly                        labeled images and records, and need for prophylactic antibiotics were                        verified by the physician, the nurse, the anesthesiologist and the                        anesthetist in the pre-procedure areain the procedure room.                       After obtaining informed consent, the endoscope was passed under direct                        vision. Throughout the procedure, the patient's blood pressure, pulse, and                        oxygen saturations were monitored continuously.                       After obtaining informed consent, the endoscope was passed under direct                        vision. Throughout the procedure, the patient's blood pressure, pulse, and                        oxygen saturations were monitored continuously.The upper GI endoscopy was                        accomplished without difficulty. The patient tolerated the procedure well.                        The was introduced through the mouth, and advanced to the second part of                        duodenum.                                                                                                        Findings:       The examined esophagus was normal. Biopsies were taken from the proximal, mid, anddistal        esophagus with a cold forceps for histology to rule out eosinophilic esophagitis (2 bx per        site).       A sliding hiatal hernia was noted. The rest of the examined stomach was otherwise normal.       The duodenal bulb and 2nd part of the duodenum were normal.                                                                                                        Impression:          - Normal EGD. No evidence of causes of odynophagia or dysphagia. EoE Bx taken  Recommendation:   - Return patient to [facility] for ongoing care.                       - Await pathology results.                       - Advance diet as tolerated.                       - GI to follow                                              Attending Participation:       I was present and participated during the entire procedure, including non-key portions.                   _________________  Shorty Molina,   7/7/2020 1:58:11 PM  Number of Addenda: 0    Note Initiated On: 7/7/2020 10:12 AM    < end of copied text > Chief Complaint: Nausea/vomiting  Reason for consult: Nausea/vomiting    Interval Events:   - The patient underwent upper endoscopy yesterday (Please see full report in Results section of [facility])  - The patient endorses multiple episodes of vomiting yesterday evening after drinking liquids and having some pudding  - She was able to tolerate liquids during the day today and is eager to go home    Allergies:  Compazine (Angioedema; Dystonic RXN; Anaphylaxis)  metoclopramide (Angioedema; Dystonic RXN; Anaphylaxis)    Hospital Medications:  lactated ringers. 500 milliLiter(s) IV Continuous <Continuous>  ondansetron Injectable 4 milliGRAM(s) IV Push every 6 hours PRN  valproate sodium IVPB 62.5 milliGRAM(s) IV Intermittent <User Schedule>    PMHX/PSHX:  PANDAS (pediatric autoimmune neuropsychiatric disease associated with streptococcal infection)  Anxiety    Family history:  No pertinent family history in first degree relatives    ROS:     General:  No wt loss, fevers, chills, night sweats, fatigue,   Eyes:  Good vision, no reported pain  ENT:  No sore throat, pain, runny nose, dysphagia  CV:  No pain, palpitations, hypo/hypertension  Pulm:  No dyspnea, cough, tachypnea, wheezing  GI:  No pain, + nausea, + vomiting, No diarrhea, No constipation, No weight loss, No fever, No pruritis, No rectal bleeding, No tarry stools, No dysphagia  :  No pain, bleeding, incontinence, nocturia  Muscle:  No pain, weakness  Neuro:  No weakness, tingling, memory problems  Psych:  No fatigue, insomnia, mood problems, depression  Endocrine:  No polyuria, polydipsia, cold/heat intolerance  Heme:  No petechiae, ecchymosis, easy bruisability  Skin:  No rash, tattoos, scars, edema    PHYSICAL EXAM:   Vital Signs:  Vital Signs Last 24 Hrs  T(C): 36.7 (08 Jul 2020 09:29), Max: 36.9 (07 Jul 2020 14:06)  T(F): 98 (08 Jul 2020 09:29), Max: 98.4 (07 Jul 2020 14:06)  HR: 95 (08 Jul 2020 09:29) (78 - 100)  BP: 112/67 (08 Jul 2020 09:29) (109/70 - 136/97)  BP(mean): --  RR: 18 (08 Jul 2020 09:29) (18 - 18)  SpO2: 99% (08 Jul 2020 09:29) (97% - 99%)    GENERAL:  No acute distress  HEENT:  Normocephalic/atraumtic,  no scleral icterus  CHEST:  Normal effort, no accessory muscle use  ABDOMEN:  Soft, non-tender, non-distended, normoactive bowel sounds  EXTREMITIES:  No cyanosis, clubbing, or edema  SKIN:  No rash/erythema  NEURO:  Alert and oriented x 3    LABS:                        11.9   7.33  )-----------( 380      ( 07 Jul 2020 07:11 )             37.1       07-07    139  |  106  |  8   ----------------------------<  80  4.2   |  22  |  0.85    Ca    9.2      07 Jul 2020 07:10  Phos  3.6     07-07  Mg     2.2     07-07    TPro  7.7  /  Alb  4.6  /  TBili  0.2  /  DBili  x   /  AST  14  /  ALT  12  /  AlkPhos  91  07-06    LIVER FUNCTIONS - ( 06 Jul 2020 21:03 )  Alb: 4.6 g/dL / Pro: 7.7 g/dL / ALK PHOS: 91 U/L / ALT: 12 U/L / AST: 14 U/L / GGT: x           PT/INR - ( 06 Jul 2020 21:03 )   PT: 12.8 sec;   INR: 1.12 ratio      PTT - ( 06 Jul 2020 21:03 )  PTT:29.3 sec               11.9   7.33  )-----------( 380      ( 07 Jul 2020 07:11 )             37.1                         12.8   9.28  )-----------( 391      ( 06 Jul 2020 21:03 )             38.8     Imaging:    Reviewed    Procedures:    < from: Upper Endoscopy (07.07.20 @ 10:12) >    [facility]  ____________________________________________________________________________________________________  Patient Name: Arabella Valdes                      MRN: [id number]  Account Number: [id number]                     YOB: 2000  Room: Endoscopy Room 1                           Gender: Female  Attending MD: Shorty Molina ,                  Procedure Date No Time: 7/7/2020  ____________________________________________________________________________________________________     Procedure:           Upper GI endoscopy  Indications:         Dysphagia  Providers:           Rc Peng (Fellow)  Medicines:           Monitored Anesthesia Care  Complications:       No immediate complications. Estimated blood loss: Minimal.  ____________________________________________________________________________________________________  Procedure:           Pre-Anesthesia Assessment:                       - Universal Protocol:           - Pre-procedure Verification: Prior to the procedure, the patient's identity                        was verified by full name, date of birth and medical record number. The                        patient's identity was verified on all pertinent medical records, including                        History and Physical, nursing assessment and pre-anesthesia assessment. Also                        prior to the procedure, a History and Physical was performed, and patient                        medications, allergies and sensitivities were reviewed. The patient's                        tolerance of previous anesthesia was reviewed. The risks and benefits of the                        procedure and the sedation options and risks were discussedwith the patient.                        All questions were answered and informed consent was obtained.                       - Marking: The endoscopic procedure was visually marked on a patient wrist                        band delineating the patient name, proposed procedure and endoscopist's                        initials.                       - Time-Out: Prior to the start of the procedure, the patient's                        identification, proposed procedure, accurate signed consent, correctly                        labeled images and records, and need for prophylactic antibiotics were                        verified by the physician, the nurse, the anesthesiologist and the                        anesthetist in the pre-procedure areain the procedure room.                       After obtaining informed consent, the endoscope was passed under direct                        vision. Throughout the procedure, the patient's blood pressure, pulse, and                        oxygen saturations were monitored continuously.                       After obtaining informed consent, the endoscope was passed under direct                        vision. Throughout the procedure, the patient's blood pressure, pulse, and                        oxygen saturations were monitored continuously.The upper GI endoscopy was                        accomplished without difficulty. The patient tolerated the procedure well.                        The was introduced through the mouth, and advanced to the second part of                        duodenum.                                                                                                        Findings:       The examined esophagus was normal. Biopsies were taken from the proximal, mid, anddistal        esophagus with a cold forceps for histology to rule out eosinophilic esophagitis (2 bx per        site).       A sliding hiatal hernia was noted. The rest of the examined stomach was otherwise normal.       The duodenal bulb and 2nd part of the duodenum were normal.                                                                                                        Impression:          - Normal EGD. No evidence of causes of odynophagia or dysphagia. EoE Bx taken  Recommendation:   - Return patient to [facility] for ongoing care.                       - Await pathology results.                       - Advance diet as tolerated.                       - GI to follow                                              Attending Participation:       I was present and participated during the entire procedure, including non-key portions.                   _________________  Shorty Molina,   7/7/2020 1:58:11 PM  Number of Addenda: 0    Note Initiated On: 7/7/2020 10:12 AM    < end of copied text >

## 2020-07-08 NOTE — PROGRESS NOTE ADULT - SUBJECTIVE AND OBJECTIVE BOX
Patient still unable to tolerate PO liquids or solids. She had a few sips of water since the morning but nothing more. Denies nausea, constipation abdominal pain, diarrhea.     GENERAL: No fevers, no chills.  EYES: No blurry vision,  No photophobia  ENT: No sore throat.  No dysphagia  Cardiovascular: No chest pain, palpitations, orthopnea  Pulmonary: No cough, no wheezing. No shortness of breath  Gastrointestinal: No abdominal pain, no diarrhea, no constipation. inability to tolerate anything PO     Musculoskeletal: No weakness.  No myalgias.  Dermatology:  No rashes.  Neuro: No Headache.  No vertigo.  No dizziness.  Psych: No anxiety, no depression.  Denies suicidal thoughts.    MEDICATIONS  (STANDING):  lactated ringers. 500 milliLiter(s) (100 mL/Hr) IV Continuous <Continuous>  valproate sodium IVPB 62.5 milliGRAM(s) IV Intermittent <User Schedule>    MEDICATIONS  (PRN):  ondansetron Injectable 4 milliGRAM(s) IV Push every 6 hours PRN Nausea and/or Vomiting    Vital Signs Last 24 Hrs  T(C): 36.7 (08 Jul 2020 09:29), Max: 36.9 (07 Jul 2020 14:06)  T(F): 98 (08 Jul 2020 09:29), Max: 98.4 (07 Jul 2020 14:06)  HR: 95 (08 Jul 2020 09:29) (78 - 100)  BP: 112/67 (08 Jul 2020 09:29) (109/70 - 136/97)  BP(mean): --  RR: 18 (08 Jul 2020 09:29) (18 - 18)  SpO2: 99% (08 Jul 2020 09:29) (97% - 99%)    GENERAL: NAD  HEAD:  Atraumatic, Normocephalic  EYES: EOMI, PERRLA, conjunctiva and sclera clear  ENT: Pharynx not erythematous  PULMONARY: Clear to auscultation bilaterally; No wheeze  CARDIOVASCULAR: Regular rate and rhythm; No murmurs, rubs, or gallops  ABDOMEN: Soft, Nontender, Nondistended; Bowel sounds present  EXTREMITIES:  2+ Peripheral Pulses, No clubbing, cyanosis, or edema  MUSCULOSKELETAL: No calf tenderness  PSYCH: AAOx3, normal affect  SKIN: warm and dry, No rashes or lesions    .  LABS:                         11.9   7.33  )-----------( 380      ( 07 Jul 2020 07:11 )             37.1     07-07    139  |  106  |  8   ----------------------------<  80  4.2   |  22  |  0.85    Ca    9.2      07 Jul 2020 07:10  Phos  3.6     07-07  Mg     2.2     07-07    TPro  7.7  /  Alb  4.6  /  TBili  0.2  /  DBili  x   /  AST  14  /  ALT  12  /  AlkPhos  91  07-06    PT/INR - ( 06 Jul 2020 21:03 )   PT: 12.8 sec;   INR: 1.12 ratio         PTT - ( 06 Jul 2020 21:03 )  PTT:29.3 sec          RADIOLOGY, EKG & ADDITIONAL TESTS: Reviewed.

## 2020-07-08 NOTE — PROGRESS NOTE ADULT - ATTENDING COMMENTS
discussed with mother at bedside on 7/7  discussed with ANUJA Tillman D.O.  Hospitalist Pager # [phone]

## 2020-07-08 NOTE — PROGRESS NOTE ADULT - ASSESSMENT
20 yo woman with PMH of PANDAS, CRPS presents from gastroenterologist office in the setting of inability to swallow solids and liquids with resulting vomiting the past 3 days- egd unremarkable, awaiting gastric emptying study.

## 2020-07-08 NOTE — PROGRESS NOTE ADULT - ASSESSMENT
Impression:    18 y/o F w/ hx of PANDAS and CRPS sent in by primary GI due to inability to tolerate solids and liquids with reported vomiting x 3 days.    # Inability to tolerate PO intake: EGD on 7/7/20 with sliding hiatal hernia, but otherwise no esophageal anatomic abnormality. Biopsies obtained to rule out EoE. Likely represents gastroparesis - may represent motility disorder.  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 Impression:    18 y/o F w/ hx of PANDAS and CRPS sent in by primary GI due to inability to tolerate solids and liquids with reported vomiting x 3 days.    # Inability to tolerate PO intake: EGD on 7/7/20 with sliding hiatal hernia, but otherwise no esophageal anatomic abnormality. Biopsies obtained to rule out EoE. Likely represents gastroparesis - may represent motility disorder.  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 Shorty Wynn  - F/U pathology  - Advance diet as tolerated  - Rest of care per primary team    Rc Hutchison MD  Gastroenterology Fellow  Please contact via [employer] Teams    Please call answering service for on-call GI fellow ([phone]) after 5pm and before 8am, and on weekends.

## 2020-07-09 ENCOUNTER — TRANSCRIPTION ENCOUNTER (OUTPATIENT)
Age: 20
End: 2020-07-09

## 2020-07-09 VITALS
HEART RATE: 100 BPM | OXYGEN SATURATION: 96 % | SYSTOLIC BLOOD PRESSURE: 106 MMHG | TEMPERATURE: 98 F | DIASTOLIC BLOOD PRESSURE: 71 MMHG | RESPIRATION RATE: 18 BRPM

## 2020-07-09 PROCEDURE — 88305 TISSUE EXAM BY PATHOLOGIST: CPT

## 2020-07-09 PROCEDURE — 99239 HOSP IP/OBS DSCHRG MGMT >30: CPT

## 2020-07-09 PROCEDURE — 80164 ASSAY DIPROPYLACETIC ACD TOT: CPT

## 2020-07-09 PROCEDURE — A9548: CPT

## 2020-07-09 PROCEDURE — 99285 EMERGENCY DEPT VISIT HI MDM: CPT | Mod: 25

## 2020-07-09 PROCEDURE — 84702 CHORIONIC GONADOTROPIN TEST: CPT

## 2020-07-09 PROCEDURE — 86850 RBC ANTIBODY SCREEN: CPT

## 2020-07-09 PROCEDURE — 78264 GASTRIC EMPTYING IMG STUDY: CPT | Mod: 26

## 2020-07-09 PROCEDURE — 86900 BLOOD TYPING SEROLOGIC ABO: CPT

## 2020-07-09 PROCEDURE — 80048 BASIC METABOLIC PNL TOTAL CA: CPT

## 2020-07-09 PROCEDURE — 71046 X-RAY EXAM CHEST 2 VIEWS: CPT

## 2020-07-09 PROCEDURE — A9541: CPT

## 2020-07-09 PROCEDURE — 84100 ASSAY OF PHOSPHORUS: CPT

## 2020-07-09 PROCEDURE — 86901 BLOOD TYPING SEROLOGIC RH(D): CPT

## 2020-07-09 PROCEDURE — 83735 ASSAY OF MAGNESIUM: CPT

## 2020-07-09 PROCEDURE — 85027 COMPLETE CBC AUTOMATED: CPT

## 2020-07-09 PROCEDURE — 85730 THROMBOPLASTIN TIME PARTIAL: CPT

## 2020-07-09 PROCEDURE — 80053 COMPREHEN METABOLIC PANEL: CPT

## 2020-07-09 PROCEDURE — 88312 SPECIAL STAINS GROUP 1: CPT

## 2020-07-09 PROCEDURE — 82962 GLUCOSE BLOOD TEST: CPT

## 2020-07-09 PROCEDURE — 78264 GASTRIC EMPTYING IMG STUDY: CPT

## 2020-07-09 PROCEDURE — 85610 PROTHROMBIN TIME: CPT

## 2020-07-09 PROCEDURE — 99232 SBSQ HOSP IP/OBS MODERATE 35: CPT

## 2020-07-09 RX ORDER — SODIUM CHLORIDE 9 MG/ML
1000 INJECTION, SOLUTION INTRAVENOUS
Refills: 0 | Status: DISCONTINUED | OUTPATIENT
Start: 2020-07-09 | End: 2020-07-09

## 2020-07-09 NOTE — PROGRESS NOTE ADULT - PROBLEM SELECTOR PLAN 4
IMPROVE score = 0  Encourage ambulation

## 2020-07-09 NOTE — DISCHARGE NOTE PROVIDER - NSDCMRMEDTOKEN_GEN_ALL_CORE_FT
Depakote  mg oral tablet, extended release: 1 tab(s) orally every other day  JUNEL FE     TAB 1.5/30:   PROzac: 60 milligram(s) orally once a day  Topamax 25 mg oral tablet: 1 tab(s) orally once a day  Topamax 50 mg oral tablet: 1 tab(s) orally once a day (at bedtime), As Needed

## 2020-07-09 NOTE — DISCHARGE NOTE PROVIDER - NSDCCPCAREPLAN_GEN_ALL_CORE_FT
PRINCIPAL DISCHARGE DIAGNOSIS  Diagnosis: Dysphagia, unspecified type  Assessment and Plan of Treatment: Gastroparesis on swallow exam      SECONDARY DISCHARGE DIAGNOSES  Diagnosis: Sinus tachycardia  Assessment and Plan of Treatment: resolved with hydration

## 2020-07-09 NOTE — PROGRESS NOTE ADULT - ASSESSMENT
Impression:    20 y/o F w/ hx of PANDAS and CRPS sent in by primary GI due to inability to tolerate solids and liquids with reported vomiting x 3 days.    # Inability to tolerate PO intake: EGD on 7/7/20 with sliding hiatal hernia, but otherwise no esophageal anatomic abnormality. Biopsies obtained to rule out EoE. 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 Shorty Wynn  - F/U pathology  - Diet as tolerated  - Rest of care per primary team    Rc Hutchison MD  Gastroenterology Fellow  Please contact via [employer] Teams    Please call answering service for on-call GI fellow ([phone]) after 5pm and before 8am, and on weekends.

## 2020-07-09 NOTE — PROGRESS NOTE ADULT - REASON FOR ADMISSION
Nausea/vomiting
unable to tolerate food and liquids
Nausea/vomiting
unable to tolerate food and liquids

## 2020-07-09 NOTE — DISCHARGE NOTE NURSING/CASE MANAGEMENT/SOCIAL WORK - PATIENT PORTAL LINK FT
You can access the FollowMyHealth Patient Portal offered by [facility] by registering at the following website: [email or web address]. By joining [facility]’s FollowMyHealth portal, you will also be able to view your health information using other applications (apps) compatible with our system.

## 2020-07-09 NOTE — PROGRESS NOTE ADULT - PROBLEM/PLAN-1
03/24/19 1100   C-SSRS (Frequent Screen)   2. Have you actually had any thoughts of killing yourself? No   3. Have you been thinking about how you might do this? No   4. Have you had these thoughts and had some intention of acting on them? No   5. Have you started to work out or worked out the details of how to kill yourself? Do you intend to carry out this plan? (past month) No   6. Have you done anything, started to do anything, or prepared to do anything to end your life? No   Suicide Evaluation Negative screen= no ideation, behaviors or history     Nursing Suicide Assessment Note - Inpatient    Current assessment:    Current C-SSRS score: (P) Negative screen= no ideation, behaviors or history      Protective Factors / Reason for Living: Social supports    Interventions:    Maintain current level care  
DISPLAY PLAN FREE TEXT

## 2020-07-09 NOTE — DISCHARGE NOTE PROVIDER - HOSPITAL COURSE
20 yo woman with PMH of PANDAS, CRPS presented from gastroenterologist office in the setting of inability to swallow solids and liquids with resulting vomiting for 3 days prior to admission.    -  s/p egd on 7/7/20 with sliding hernia, biopsies sent to r/o eosonophilic esophagitis - plan to follow up results as out patient with Primary GI    - gastric emptying study on 7/9/20 consistent with gastroparesis    - f/u with outpatient primary GI Dr. Shorty Wynn    ; no Reglan given allergy, no erythromycin given short half life.     - Discharged home on 7/9/20 - tolerating PO

## 2020-07-09 NOTE — DISCHARGE NOTE PROVIDER - CARE PROVIDER_API CALL
Shorty Wynn  GASTROENTEROLOGY  [address] [place] [id number]  Phone: [phone]  Fax: [phone]  Follow Up Time:     Bimal Bolton  PEDIATRICS  [address] [place] [id number]  Phone: [phone]  Fax: [phone]  Follow Up Time:

## 2020-07-09 NOTE — PROGRESS NOTE ADULT - SUBJECTIVE AND OBJECTIVE BOX
Patient can tolerate very small bites and sips. No nausea.    GENERAL: No fevers, no chills.  EYES: No blurry vision,  No photophobia  ENT: No sore throat.  No dysphagia  Cardiovascular: No chest pain, palpitations, orthopnea  Pulmonary: No cough, no wheezing. No shortness of breath  Gastrointestinal: No abdominal pain, no diarrhea, no constipation.    Musculoskeletal: No weakness.  No myalgias.  Dermatology:  No rashes.  Neuro: No Headache.  No vertigo.  No dizziness.  Psych: No anxiety, no depression.  Denies suicidal thoughts.    MEDICATIONS  (STANDING):  lactated ringers. 1000 milliLiter(s) (100 mL/Hr) IV Continuous <Continuous>  valproate sodium IVPB 62.5 milliGRAM(s) IV Intermittent <User Schedule>    MEDICATIONS  (PRN):  ondansetron Injectable 4 milliGRAM(s) IV Push every 6 hours PRN Nausea and/or Vomiting    Vital Signs Last 24 Hrs  T(C): 36.7 (09 Jul 2020 12:59), Max: 37.2 (08 Jul 2020 19:09)  T(F): 98.1 (09 Jul 2020 12:59), Max: 98.9 (08 Jul 2020 19:09)  HR: 100 (09 Jul 2020 12:59) (86 - 100)  BP: 106/71 (09 Jul 2020 12:59) (101/68 - 113/78)  BP(mean): --  RR: 18 (09 Jul 2020 12:59) (18 - 18)  SpO2: 96% (09 Jul 2020 12:59) (96% - 99%)    GENERAL: NAD  HEAD:  Atraumatic, Normocephalic  EYES: EOMI, PERRLA, conjunctiva and sclera clear  ENT: Pharynx not erythematous  PULMONARY: Clear to auscultation bilaterally; No wheeze  CARDIOVASCULAR: Regular rate and rhythm; No murmurs, rubs, or gallops  ABDOMEN: Soft, Nontender, Nondistended; Bowel sounds present  EXTREMITIES:  2+ Peripheral Pulses, No clubbing, cyanosis, or edema  MUSCULOSKELETAL: No calf tenderness  PSYCH: AAOx3, normal affect  SKIN: warm and dry, No rashes or lesions    .  LABS:                     RADIOLOGY, EKG & ADDITIONAL TESTS: Reviewed.

## 2020-07-09 NOTE — PROGRESS NOTE ADULT - PROBLEM SELECTOR PLAN 1
- s/p egd-- with sliding hernia, biopsies sent to r/o eosonophilic esophagitis   - gastric emptying study consistent with gastroparesis  - resume home meds  - I have discussed the case with GI fellow-- okay to dc home, f/u with outpatient GI; no reglan given allergy, no erythromycin given short half life

## 2020-07-09 NOTE — DISCHARGE NOTE PROVIDER - NSDCFUADDINST_GEN_ALL_CORE_FT
Make appointments to follow up with your physicians - See your gastroenterologist within 1 week of discharge - follow up the biopsy results (biopsy taken during EGD procedure while in hospital)  Resume your home medications. (there were no changes made to any)

## 2020-07-09 NOTE — PROGRESS NOTE ADULT - PROBLEM SELECTOR PROBLEM 3
PANDAS (pediatric autoimmune neuropsychiatric disease associated with streptococcal infection)

## 2020-07-09 NOTE — PROGRESS NOTE ADULT - ATTENDING COMMENTS
discussed with mother at bedside on 7/9, discussed with greg srinivasan today, 1 hour spent in preparation of discharge    Stormy Bianchi D.O.  Hospitalist Pager # [phone]

## 2020-07-13 ENCOUNTER — INPATIENT (INPATIENT)
Facility: HOSPITAL | Age: 20
LOS: 1 days | Discharge: HOME | DRG: 392 | End: 2020-07-15
Attending: STUDENT IN AN ORGANIZED HEALTH CARE EDUCATION/TRAINING PROGRAM | Admitting: HOSPITALIST
Payer: COMMERCIAL

## 2020-07-13 ENCOUNTER — APPOINTMENT (OUTPATIENT)
Dept: GASTROENTEROLOGY | Facility: CLINIC | Age: 20
End: 2020-07-13
Payer: COMMERCIAL

## 2020-07-13 VITALS
BODY MASS INDEX: 28.35 KG/M2 | SYSTOLIC BLOOD PRESSURE: 110 MMHG | HEIGHT: 63 IN | WEIGHT: 160 LBS | OXYGEN SATURATION: 98 % | DIASTOLIC BLOOD PRESSURE: 80 MMHG

## 2020-07-13 VITALS
HEART RATE: 113 BPM | TEMPERATURE: 99 F | HEIGHT: 63 IN | RESPIRATION RATE: 19 BRPM | DIASTOLIC BLOOD PRESSURE: 90 MMHG | SYSTOLIC BLOOD PRESSURE: 121 MMHG | WEIGHT: 160.06 LBS | OXYGEN SATURATION: 99 %

## 2020-07-13 VITALS — TEMPERATURE: 97.6 F

## 2020-07-13 DIAGNOSIS — R11.10 VOMITING, UNSPECIFIED: ICD-10-CM

## 2020-07-13 LAB
ALBUMIN SERPL ELPH-MCNC: 4.8 G/DL — SIGNIFICANT CHANGE UP (ref 3.3–5)
ALP SERPL-CCNC: 85 U/L — SIGNIFICANT CHANGE UP (ref 40–120)
ALT FLD-CCNC: 11 U/L — SIGNIFICANT CHANGE UP (ref 10–45)
ANION GAP SERPL CALC-SCNC: 21 MMOL/L — HIGH (ref 5–17)
APPEARANCE UR: CLEAR — SIGNIFICANT CHANGE UP
AST SERPL-CCNC: 14 U/L — SIGNIFICANT CHANGE UP (ref 10–40)
BACTERIA # UR AUTO: ABNORMAL
BASOPHILS # BLD AUTO: 0.05 K/UL — SIGNIFICANT CHANGE UP (ref 0–0.2)
BASOPHILS NFR BLD AUTO: 0.7 % — SIGNIFICANT CHANGE UP (ref 0–2)
BILIRUB SERPL-MCNC: 0.2 MG/DL — SIGNIFICANT CHANGE UP (ref 0.2–1.2)
BILIRUB UR-MCNC: NEGATIVE — SIGNIFICANT CHANGE UP
BUN SERPL-MCNC: 11 MG/DL — SIGNIFICANT CHANGE UP (ref 7–23)
CALCIUM SERPL-MCNC: 9.6 MG/DL — SIGNIFICANT CHANGE UP (ref 8.4–10.5)
CHLORIDE SERPL-SCNC: 99 MMOL/L — SIGNIFICANT CHANGE UP (ref 96–108)
CO2 SERPL-SCNC: 19 MMOL/L — LOW (ref 22–31)
COLOR SPEC: YELLOW — SIGNIFICANT CHANGE UP
CREAT SERPL-MCNC: 0.97 MG/DL — SIGNIFICANT CHANGE UP (ref 0.5–1.3)
DIFF PNL FLD: ABNORMAL
EOSINOPHIL # BLD AUTO: 0.02 K/UL — SIGNIFICANT CHANGE UP (ref 0–0.5)
EOSINOPHIL NFR BLD AUTO: 0.3 % — SIGNIFICANT CHANGE UP (ref 0–6)
EPI CELLS # UR: 4 /HPF — SIGNIFICANT CHANGE UP
GLUCOSE SERPL-MCNC: 66 MG/DL — LOW (ref 70–99)
GLUCOSE UR QL: NEGATIVE — SIGNIFICANT CHANGE UP
HCG SERPL-ACNC: <2 MIU/ML — SIGNIFICANT CHANGE UP
HCT VFR BLD CALC: 41 % — SIGNIFICANT CHANGE UP (ref 34.5–45)
HGB BLD-MCNC: 13.5 G/DL — SIGNIFICANT CHANGE UP (ref 11.5–15.5)
HYALINE CASTS # UR AUTO: 24 /LPF — HIGH (ref 0–2)
IMM GRANULOCYTES NFR BLD AUTO: 0.1 % — SIGNIFICANT CHANGE UP (ref 0–1.5)
KETONES UR-MCNC: ABNORMAL
LEUKOCYTE ESTERASE UR-ACNC: NEGATIVE — SIGNIFICANT CHANGE UP
LYMPHOCYTES # BLD AUTO: 2.06 K/UL — SIGNIFICANT CHANGE UP (ref 1–3.3)
LYMPHOCYTES # BLD AUTO: 28.7 % — SIGNIFICANT CHANGE UP (ref 13–44)
MAGNESIUM SERPL-MCNC: 2 MG/DL — SIGNIFICANT CHANGE UP (ref 1.6–2.6)
MCHC RBC-ENTMCNC: 27.5 PG — SIGNIFICANT CHANGE UP (ref 27–34)
MCHC RBC-ENTMCNC: 32.9 GM/DL — SIGNIFICANT CHANGE UP (ref 32–36)
MCV RBC AUTO: 83.5 FL — SIGNIFICANT CHANGE UP (ref 80–100)
MONOCYTES # BLD AUTO: 0.36 K/UL — SIGNIFICANT CHANGE UP (ref 0–0.9)
MONOCYTES NFR BLD AUTO: 5 % — SIGNIFICANT CHANGE UP (ref 2–14)
NEUTROPHILS # BLD AUTO: 4.67 K/UL — SIGNIFICANT CHANGE UP (ref 1.8–7.4)
NEUTROPHILS NFR BLD AUTO: 65.2 % — SIGNIFICANT CHANGE UP (ref 43–77)
NITRITE UR-MCNC: NEGATIVE — SIGNIFICANT CHANGE UP
NRBC # BLD: 0 /100 WBCS — SIGNIFICANT CHANGE UP (ref 0–0)
PH UR: 6 — SIGNIFICANT CHANGE UP (ref 5–8)
PHOSPHATE SERPL-MCNC: 3.5 MG/DL — SIGNIFICANT CHANGE UP (ref 2.5–4.5)
PLATELET # BLD AUTO: 364 K/UL — SIGNIFICANT CHANGE UP (ref 150–400)
POTASSIUM SERPL-MCNC: 3.6 MMOL/L — SIGNIFICANT CHANGE UP (ref 3.5–5.3)
POTASSIUM SERPL-SCNC: 3.6 MMOL/L — SIGNIFICANT CHANGE UP (ref 3.5–5.3)
PROT SERPL-MCNC: 8.1 G/DL — SIGNIFICANT CHANGE UP (ref 6–8.3)
PROT UR-MCNC: ABNORMAL
RBC # BLD: 4.91 M/UL — SIGNIFICANT CHANGE UP (ref 3.8–5.2)
RBC # FLD: 12.8 % — SIGNIFICANT CHANGE UP (ref 10.3–14.5)
RBC CASTS # UR COMP ASSIST: 4 /HPF — SIGNIFICANT CHANGE UP (ref 0–4)
SARS-COV-2 RNA SPEC QL NAA+PROBE: SIGNIFICANT CHANGE UP
SODIUM SERPL-SCNC: 139 MMOL/L — SIGNIFICANT CHANGE UP (ref 135–145)
SP GR SPEC: 1.03 — HIGH (ref 1.01–1.02)
UROBILINOGEN FLD QL: ABNORMAL
VALPROATE SERPL-MCNC: 4 UG/ML — LOW (ref 50–100)
WBC # BLD: 7.17 K/UL — SIGNIFICANT CHANGE UP (ref 3.8–10.5)
WBC # FLD AUTO: 7.17 K/UL — SIGNIFICANT CHANGE UP (ref 3.8–10.5)
WBC UR QL: 8 /HPF — HIGH (ref 0–5)

## 2020-07-13 PROCEDURE — 93010 ELECTROCARDIOGRAM REPORT: CPT | Mod: 59

## 2020-07-13 PROCEDURE — 99285 EMERGENCY DEPT VISIT HI MDM: CPT

## 2020-07-13 PROCEDURE — 99215 OFFICE O/P EST HI 40 MIN: CPT

## 2020-07-13 PROCEDURE — 74177 CT ABD & PELVIS W/CONTRAST: CPT | Mod: 26

## 2020-07-13 RX ORDER — TOPIRAMATE 50 MG/1
50 TABLET, COATED ORAL
Refills: 0 | Status: ACTIVE | COMMUNITY

## 2020-07-13 RX ORDER — ONDANSETRON 8 MG/1
4 TABLET, FILM COATED ORAL ONCE
Refills: 0 | Status: COMPLETED | OUTPATIENT
Start: 2020-07-13 | End: 2020-07-13

## 2020-07-13 RX ORDER — SODIUM CHLORIDE 9 MG/ML
1000 INJECTION INTRAMUSCULAR; INTRAVENOUS; SUBCUTANEOUS ONCE
Refills: 0 | Status: COMPLETED | OUTPATIENT
Start: 2020-07-13 | End: 2020-07-13

## 2020-07-13 RX ADMIN — SODIUM CHLORIDE 1000 MILLILITER(S): 9 INJECTION INTRAMUSCULAR; INTRAVENOUS; SUBCUTANEOUS at 16:17

## 2020-07-13 RX ADMIN — SODIUM CHLORIDE 1000 MILLILITER(S): 9 INJECTION INTRAMUSCULAR; INTRAVENOUS; SUBCUTANEOUS at 14:49

## 2020-07-13 NOTE — ED PROVIDER NOTE - PHYSICAL EXAMINATION
GEN: NAD, alert  HEENT: NCAT, no jaundice, dry mucous membrane   CHEST/LUNGS: Non-tachypneic, CTAB, bilateral breath sounds  CARDIAC: RRR, +S1S2  ABDOMEN: Soft, NTND, No rebound/guarding, no hepatosplenomegaly, no CVA tenderness  MSK: No edema, no gross deformity of extremities  SKIN: No rashes, no petechiae, no vesicles  NEURO: CN grossly intact, normal coordination, no focal motor or sensory deficits  PSYCH: Alert, appropriate, cooperative, with capacity and insight

## 2020-07-13 NOTE — REVIEW OF SYSTEMS
[As Noted in HPI] : as noted in HPI [Negative] : Cardiovascular [Fever] : no fever [Chills] : no chills

## 2020-07-13 NOTE — PHYSICAL EXAM
[General Appearance - Alert] : alert [General Appearance - In No Acute Distress] : in no acute distress [Neck Appearance] : the appearance of the neck was normal [Neck Cervical Mass (___cm)] : no neck mass was observed [Jugular Venous Distention Increased] : there was no jugular-venous distention [] : no respiratory distress [Respiration, Rhythm And Depth] : normal respiratory rhythm and effort [Exaggerated Use Of Accessory Muscles For Inspiration] : no accessory muscle use [Auscultation Breath Sounds / Voice Sounds] : lungs were clear to auscultation bilaterally [Apical Impulse] : the apical impulse was normal [Heart Sounds] : normal S1 and S2 [Heart Rate And Rhythm] : heart rate was normal and rhythm regular [Abdomen Soft] : soft [Bowel Sounds] : normal bowel sounds [Abdomen Tenderness] : non-tender

## 2020-07-13 NOTE — ED CLERICAL - NS ED CLERK NOTE PRE-ARRIVAL INFORMATION; ADDITIONAL PRE-ARRIVAL INFORMATION
CC/Reason For referral: IV fluids - dehydration - unable to tolerate po  Preferred Consultant(if applicable): HOUSE GI  Who admits for you (if needed): HOSPITALIST  Do you have documents you would like to fax over? NO  Would you still like to speak to an ED attending? YES    PT RECENTLY DISCHARGED

## 2020-07-13 NOTE — HISTORY OF PRESENT ILLNESS
[FreeTextEntry1] : Admitted to [facility] 7/6 - 7/9/20 with inability to swallow anything but few sips of water\par Had EGD -  normal, biopsies normal\par Gastric emptying scan: abnormal (c/w gastroparesis)\par \par Is allergic to Compazine and/or Reglan (received both together after dental extraction and had a dystonic reaction)\par \par At this point - troubled by regurgitation of food and liquids after meals - sometimes immediately, and sometimes 3 hours later\par Has only been eating ice pops, but regurgitates that; tried [employer] - came right back up\par \par Has been unable to take in even 3 sips of water a day\par Abdomen is distended and bloated\par Has lost 7 lb\par No chest pain or abdominal pain

## 2020-07-13 NOTE — ED ADULT NURSE NOTE - OBJECTIVE STATEMENT
20 y/o female presents to the ED with c/o vomiting. States she was recently admitted and dx with gastroparesis. States she was discharged on 7/9 but even then was still not able to hold anything down. States she was told to follow up with her GI which she did today and he told her to come back to the hospital for a CT scan and admission. Pt denies new symptoms just states that she is concerned she is dehydrated due to lack of PO intake. Denies abd pain. States she is not nauseous she just cannot keep anything down.

## 2020-07-13 NOTE — ED ADULT TRIAGE NOTE - CHIEF COMPLAINT QUOTE
recent dx with gastroparesis   has not been able to tolerate po food or liquids since hospital discharge on 7/9  GI dr sent in for IV fluids/dehydration

## 2020-07-13 NOTE — ED PROVIDER NOTE - CLINICAL SUMMARY MEDICAL DECISION MAKING FREE TEXT BOX
20 y/o F with intractable vomiting sent in by Dr. Wynn, GI, for admission and possible further imaging. Will give IV hydration, labs, and admit.

## 2020-07-13 NOTE — ED ADULT NURSE NOTE - NSIMPLEMENTINTERV_GEN_ALL_ED
Implemented All Universal Safety Interventions:  [place] to call system. Call bell, personal items and telephone within reach. Instruct patient to call for assistance. Room bathroom lighting operational. Non-slip footwear when patient is off stretcher. Physically safe environment: no spills, clutter or unnecessary equipment. Stretcher in lowest position, wheels locked, appropriate side rails in place.

## 2020-07-13 NOTE — ED PROVIDER NOTE - PROGRESS NOTE DETAILS
Cheryl-PGY2: spoke to Dr. Wynn, recommending CT A/P with PO and IV contrast to evaluate for possible gastric outlet obstruction and admission to hospitalist for failure to tolerate PO intake/house GI consult. Cheryl-PGY2: paged hospitalist, awaiting callback. Suzanne Sheppard) - 19 year old F with recent Dx of gastroparesis ( DC'd on 7/9 and received a Rx for Domperidone, has not started taking it yet) presents to ED with inability to tolerate PO. Every time she tries to eat she has non-bloody non-bilious vomiting. No abdominal pain. Last BM was 2 days ago, no blood and no mucus. On exam, pt appears comfortable. Normal abdominal exam. Pt is receiving D5 of fluids to address low glucose of 66. Pt is mentating well. Awaiting results of CT scans of abdomen and pelvis. Most likely will require admission for further eval by GI.

## 2020-07-13 NOTE — ASSESSMENT
[FreeTextEntry1] : \par Post-prandial vomiting - even sips of water; she has not had solid food in over a week, and vomits liquids\par \par The diagnosis of gastroparesis may perhaps fit - but she still needs IVF as she cannot take in any sig liquids\par Gastric outlet obstruction should be ruled out with imaging; she states that she cannot swallow anything, including barium; perhaps a CT can be obtained\par Will order domperidone now (she is likely allergic to Reglan);\par Can try Zofran before attempts at eating or drinking\par This may also be a manifestation of functional dyspepsia; might consider Buspar if GOO is ruled out\par \par \par \par

## 2020-07-13 NOTE — ED PROVIDER NOTE - OBJECTIVE STATEMENT
18 y/o F with pmhx of PANDAS, and CRPS presents with constant emesis. Pt was discharged here on the 9th for gastroparesis to follow up with Dr. Wynn. States has not been able to tolerate PO since discharge. States the only time she was able to keep anything down was when she was admitted and had gastric emptying study done. Endorses intermittent epigastric pain, nausea, lightheadedness, and dizziness. Was sent here by Dr. Wynn for further evaluation. 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 Denies muscle aches or muscle cramping, cough, sob, diarrhea, fever. PT is on Depakote, Topamax, and BCP. LNMP: few days ago. No tobacco or etoh use. IUTD. Allergic to compazine and metoclopramide. 20 y/o F with pmhx of PANDAS, and CRPS presents with constant emesis. Pt was discharged here on the 9th for gastroparesis to follow up with Dr. Wynn. States has not been able to tolerate PO since discharge. States the only time she was able to keep anything down was when she was admitted and had gastric emptying study done. Endorses intermittent epigastric pain, nausea, lightheadedness, and dizziness. Was sent here by Dr. Wynn for further evaluation. Of note, pt is known to have recurrent mycoplasma PNA and recently completed Z-pack. Denies muscle aches or muscle cramping, cough, sob, diarrhea, fever. PT is on Depakote, Topamax, Prozac, and OCP. LMP: few days ago. No tobacco or etoh use. IUTD. Allergic to compazine and metoclopramide. 20 y/o F with pmhx of PANDAS, and CRPS presents with constant emesis. Pt was discharged here on the 9th for gastroparesis to follow up with Dr. Wynn. States has not been able to tolerate PO since discharge. States the only time she was able to keep anything down was when she was admitted and had gastric emptying study done. Endorses intermittent epigastric pain, nausea, lightheadedness, and dizziness. Was sent here by Dr. Wynn for further evaluation. Of note, pt is known to have recurrent mycoplasma PNA and recently completed Z-pack. Denies muscle aches or muscle cramping, cough, sob, diarrhea, fever. PT is on Depakote, Topamax, Prozac, and OCP. LMP: few days ago. No tobacco or etoh use. IUTD. Allergic to compazine and metoclopramide.      Attn - pt seen in [facility] - agree with above - pt d/c'd from [facility] with vomiting 3 days ago and no cannot keep anything down.  Seen by GI today and sent to ER for CT AB/Pelvis and IV hydration and labs.  Pt occasional epigastric pain.  no GI bleeding, no fever. reports abnormal gastric emptying study.

## 2020-07-13 NOTE — ED ADULT NURSE NOTE - CHPI ED NUR SYMPTOMS NEG
no fever/no abdominal distension/no diarrhea/no blood in stool/no burning urination/no chills/no nausea/no dysuria/no hematuria

## 2020-07-14 DIAGNOSIS — R11.10 VOMITING, UNSPECIFIED: ICD-10-CM

## 2020-07-14 DIAGNOSIS — Z02.9 ENCOUNTER FOR ADMINISTRATIVE EXAMINATIONS, UNSPECIFIED: ICD-10-CM

## 2020-07-14 DIAGNOSIS — K31.84 GASTROPARESIS: ICD-10-CM

## 2020-07-14 DIAGNOSIS — Z00.00 ENCOUNTER FOR GENERAL ADULT MEDICAL EXAMINATION WITHOUT ABNORMAL FINDINGS: ICD-10-CM

## 2020-07-14 DIAGNOSIS — F41.9 ANXIETY DISORDER, UNSPECIFIED: ICD-10-CM

## 2020-07-14 DIAGNOSIS — G90.50 COMPLEX REGIONAL PAIN SYNDROME I, UNSPECIFIED: ICD-10-CM

## 2020-07-14 DIAGNOSIS — G90.522 COMPLEX REGIONAL PAIN SYNDROME I OF LEFT LOWER LIMB: ICD-10-CM

## 2020-07-14 DIAGNOSIS — D89.89 OTHER SPECIFIED DISORDERS INVOLVING THE IMMUNE MECHANISM, NOT ELSEWHERE CLASSIFIED: ICD-10-CM

## 2020-07-14 LAB
ALBUMIN SERPL ELPH-MCNC: 4.4 G/DL — SIGNIFICANT CHANGE UP (ref 3.3–5)
ALP SERPL-CCNC: 75 U/L — SIGNIFICANT CHANGE UP (ref 40–120)
ALT FLD-CCNC: 7 U/L — LOW (ref 10–45)
ANION GAP SERPL CALC-SCNC: 11 MMOL/L — SIGNIFICANT CHANGE UP (ref 5–17)
ANION GAP SERPL CALC-SCNC: 13 MMOL/L — SIGNIFICANT CHANGE UP (ref 5–17)
AST SERPL-CCNC: 14 U/L — SIGNIFICANT CHANGE UP (ref 10–40)
BILIRUB SERPL-MCNC: 0.2 MG/DL — SIGNIFICANT CHANGE UP (ref 0.2–1.2)
BUN SERPL-MCNC: 6 MG/DL — LOW (ref 7–23)
BUN SERPL-MCNC: <4 MG/DL — LOW (ref 7–23)
CALCIUM SERPL-MCNC: 8.6 MG/DL — SIGNIFICANT CHANGE UP (ref 8.4–10.5)
CALCIUM SERPL-MCNC: 9.5 MG/DL — SIGNIFICANT CHANGE UP (ref 8.4–10.5)
CHLORIDE SERPL-SCNC: 104 MMOL/L — SIGNIFICANT CHANGE UP (ref 96–108)
CHLORIDE SERPL-SCNC: 105 MMOL/L — SIGNIFICANT CHANGE UP (ref 96–108)
CO2 SERPL-SCNC: 22 MMOL/L — SIGNIFICANT CHANGE UP (ref 22–31)
CO2 SERPL-SCNC: 25 MMOL/L — SIGNIFICANT CHANGE UP (ref 22–31)
CREAT SERPL-MCNC: 0.77 MG/DL — SIGNIFICANT CHANGE UP (ref 0.5–1.3)
CREAT SERPL-MCNC: 0.78 MG/DL — SIGNIFICANT CHANGE UP (ref 0.5–1.3)
GLUCOSE BLDC GLUCOMTR-MCNC: 117 MG/DL — HIGH (ref 70–99)
GLUCOSE SERPL-MCNC: 105 MG/DL — HIGH (ref 70–99)
GLUCOSE SERPL-MCNC: 98 MG/DL — SIGNIFICANT CHANGE UP (ref 70–99)
POTASSIUM SERPL-MCNC: 3.4 MMOL/L — LOW (ref 3.5–5.3)
POTASSIUM SERPL-MCNC: 3.7 MMOL/L — SIGNIFICANT CHANGE UP (ref 3.5–5.3)
POTASSIUM SERPL-SCNC: 3.4 MMOL/L — LOW (ref 3.5–5.3)
POTASSIUM SERPL-SCNC: 3.7 MMOL/L — SIGNIFICANT CHANGE UP (ref 3.5–5.3)
PROT SERPL-MCNC: 6.9 G/DL — SIGNIFICANT CHANGE UP (ref 6–8.3)
SARS-COV-2 IGG SERPL QL IA: NEGATIVE — SIGNIFICANT CHANGE UP
SARS-COV-2 IGM SERPL IA-ACNC: <0.1 INDEX — SIGNIFICANT CHANGE UP
SODIUM SERPL-SCNC: 140 MMOL/L — SIGNIFICANT CHANGE UP (ref 135–145)
SODIUM SERPL-SCNC: 140 MMOL/L — SIGNIFICANT CHANGE UP (ref 135–145)
T4 AB SER-ACNC: 8.2 UG/DL — SIGNIFICANT CHANGE UP (ref 4.6–12)
TSH SERPL-MCNC: 2.68 UIU/ML — SIGNIFICANT CHANGE UP (ref 0.27–4.2)

## 2020-07-14 PROCEDURE — 99223 1ST HOSP IP/OBS HIGH 75: CPT | Mod: GC

## 2020-07-14 PROCEDURE — 12345: CPT | Mod: NC,GC

## 2020-07-14 RX ORDER — DIVALPROEX SODIUM 500 MG/1
1 TABLET, DELAYED RELEASE ORAL
Qty: 0 | Refills: 0 | DISCHARGE

## 2020-07-14 RX ORDER — POTASSIUM CHLORIDE 20 MEQ
10 PACKET (EA) ORAL
Refills: 0 | Status: COMPLETED | OUTPATIENT
Start: 2020-07-14 | End: 2020-07-14

## 2020-07-14 RX ORDER — ERYTHROMYCIN ETHYLSUCCINATE 400 MG
250 TABLET ORAL ONCE
Refills: 0 | Status: COMPLETED | OUTPATIENT
Start: 2020-07-14 | End: 2020-07-14

## 2020-07-14 RX ORDER — TOPIRAMATE 25 MG
1 TABLET ORAL
Qty: 0 | Refills: 0 | DISCHARGE

## 2020-07-14 RX ORDER — ONDANSETRON 8 MG/1
4 TABLET, FILM COATED ORAL ONCE
Refills: 0 | Status: COMPLETED | OUTPATIENT
Start: 2020-07-14 | End: 2020-07-14

## 2020-07-14 RX ORDER — ERYTHROMYCIN ETHYLSUCCINATE 400 MG
TABLET ORAL
Refills: 0 | Status: DISCONTINUED | OUTPATIENT
Start: 2020-07-14 | End: 2020-07-15

## 2020-07-14 RX ORDER — ERYTHROMYCIN ETHYLSUCCINATE 400 MG
TABLET ORAL
Refills: 0 | Status: DISCONTINUED | OUTPATIENT
Start: 2020-07-14 | End: 2020-07-14

## 2020-07-14 RX ORDER — NORETHINDRONE AND ETHINYL ESTRADIOL 0.4-0.035
0 KIT ORAL
Qty: 28 | Refills: 0 | DISCHARGE

## 2020-07-14 RX ORDER — ERYTHROMYCIN ETHYLSUCCINATE 400 MG
250 TABLET ORAL EVERY 8 HOURS
Refills: 0 | Status: DISCONTINUED | OUTPATIENT
Start: 2020-07-14 | End: 2020-07-15

## 2020-07-14 RX ORDER — TOPIRAMATE 25 MG
50 TABLET ORAL AT BEDTIME
Refills: 0 | Status: DISCONTINUED | OUTPATIENT
Start: 2020-07-14 | End: 2020-07-14

## 2020-07-14 RX ORDER — DIVALPROEX SODIUM 500 MG/1
250 TABLET, DELAYED RELEASE ORAL
Refills: 0 | Status: DISCONTINUED | OUTPATIENT
Start: 2020-07-14 | End: 2020-07-14

## 2020-07-14 RX ORDER — TOPIRAMATE 25 MG
25 TABLET ORAL DAILY
Refills: 0 | Status: DISCONTINUED | OUTPATIENT
Start: 2020-07-14 | End: 2020-07-14

## 2020-07-14 RX ADMIN — ONDANSETRON 4 MILLIGRAM(S): 8 TABLET, FILM COATED ORAL at 05:25

## 2020-07-14 RX ADMIN — Medication 333.33 MILLIGRAM(S): at 17:41

## 2020-07-14 RX ADMIN — Medication 100 MILLIEQUIVALENT(S): at 09:47

## 2020-07-14 NOTE — H&P ADULT - NSHPLABSRESULTS_GEN_ALL_CORE
13.5   7.17  )-----------( 364      ( 13 Jul 2020 15:06 )             41.0   07-13    139  |  99  |  11  ----------------------------<  66<L>  3.6   |  19<L>  |  0.97    Ca    9.6      13 Jul 2020 15:06  Phos  3.5     07-13  Mg     2.0     07-13    TPro  8.1  /  Alb  4.8  /  TBili  0.2  /  DBili  x   /  AST  14  /  ALT  11  /  AlkPhos  85  07-13    HCG Quantitative, Serum: <2.0    Urinalysis + Microscopic Examination (07.13.20 @ 16:50)    Urine Appearance: Clear    Urobilinogen: 2 mg/dL    Specific Gravity: 1.030    Protein, Urine: 30 mg/dL    pH Urine: 6.0    Leukocyte Esterase Concentration: Negative    Nitrite: Negative    Ketone - Urine: Large    Bilirubin: Negative    Color: Yellow    Glucose Qualitative, Urine: Negative    Blood, Urine: Moderate    Red Blood Cell - Urine: 4 /hpf    White Blood Cell - Urine: 8 /HPF    Epithelial Cells: 4 /hpf    Hyaline Casts: 24 /lpf    Bacteria: Moderate    COVID-19 PCR: NotDetec (13 Jul 2020 18:33)    Valproic Acid Level, Serum: 4 ug/mL (07.13.20 @ 15:06) Labs and imaging reviewed by me              13.5   7.17  )-----------( 364      ( 13 Jul 2020 15:06 )             41.0   07-13    139  |  99  |  11  ----------------------------<  66<L>  3.6   |  19<L>  |  0.97    Ca    9.6      13 Jul 2020 15:06  Phos  3.5     07-13  Mg     2.0     07-13    TPro  8.1  /  Alb  4.8  /  TBili  0.2  /  DBili  x   /  AST  14  /  ALT  11  /  AlkPhos  85  07-13    HCG Quantitative, Serum: <2.0    Urinalysis + Microscopic Examination (07.13.20 @ 16:50)    Urine Appearance: Clear    Urobilinogen: 2 mg/dL    Specific Gravity: 1.030    Protein, Urine: 30 mg/dL    pH Urine: 6.0    Leukocyte Esterase Concentration: Negative    Nitrite: Negative    Ketone - Urine: Large    Bilirubin: Negative    Color: Yellow    Glucose Qualitative, Urine: Negative    Blood, Urine: Moderate    Red Blood Cell - Urine: 4 /hpf    White Blood Cell - Urine: 8 /HPF    Epithelial Cells: 4 /hpf    Hyaline Casts: 24 /lpf    Bacteria: Moderate    COVID-19 PCR: NotDetec (13 Jul 2020 18:33)    Valproic Acid Level, Serum: 4 ug/mL (07.13.20 @ 15:06)

## 2020-07-14 NOTE — H&P ADULT - PROBLEM SELECTOR PLAN 1
-associated with dysphagia of both solids and liquids resulting in decreased PO intake leading to starvation ketoacidosis  -possibly 2/2 to gastroparesis which may be 2/2 to hypothyroidism, autoimmune disease, etc  -replenish fluids and glucose, c/w 1L infusion of D5 and NaCl  -f/u BMP in AM  -c/s GI  -f/u TSH, T4 -associated with dysphagia of both solids and liquids resulting in decreased PO intake leading to starvation ketoacidosis  -possibly 2/2 to gastroparesis (which may be 2/2 to hypothyroidism, autoimmune disease) vs CRPS vs   -replenish fluids and glucose, c/w 1L infusion of D5 and NaCl  -f/u BMP in AM  -f/u TSH, T4  -c/s GI -associated with dysphagia of both solids and liquids resulting in decreased PO intake leading to starvation ketoacidosis  -possibly 2/2 to gastroparesis (which may be 2/2 to hypothyroidism, autoimmune disease) vs CRPS vs   -replenish fluids and glucose, c/w 1L infusion of D5 and NaCl  -f/u BMP in AM  -f/u TSH, T4  -c/s GI (see if patient will initiate outpatient med Domperidone) -associated with dysphagia of both solids and liquids resulting in decreased PO intake leading to starvation ketoacidosis  -possibly 2/2 to gastroparesis (which may be 2/2 to hypothyroidism, autoimmune disease) vs CRPS vs hiatal hernia vs ...  -replenish fluids and glucose, c/w 1L infusion of D5 and NaCl  -f/u BMP in AM  -f/u TSH, T4  -f/u GI recs (see if patient will initiate outpatient med Domperidone) -associated with dysphagia of both solids and liquids resulting in decreased PO intake leading to starvation ketoacidosis  -likely 2/2 to gastroparesis (which may be 2/2 to hypothyroidism, autoimmune disease) vs CRPS vs hiatal hernia  -replenish fluids and glucose, c/w 1L infusion of D5 and NaCl  -f/u BMP in AM  -f/u TSH, T4  -f/u GI recs (see if patient will initiate outpatient med Domperidone)

## 2020-07-14 NOTE — H&P ADULT - ATTENDING COMMENTS
Patient assigned to me by night hospitalist in charge for management and care for patient for this evening only. Care to be resumed by day hospitalist in the morning and thereafter.   Anna Matthew MD p [phone]    This patient is a 18yo lady with PMH of CRPS, gastroparesis, PANDAS, anxiety who presents to the ED due to intractable vomiting. The patient states that since she was discharged from the hospital, she has had persistent post-prandial emesis to all solids and liquids. She had felt dizzy, but denies abdominal pain, diarrhea or constipation. She does endorse bloating. Patient was recently hospitalized and underwent extensive GI eval which identified gastroparesis but had normal EGD. Patient was planned for outpatient follow up with Dr. Wynn, but was advised to come to ED due to persistent vomiting.  On exam, pt is well appearing, NAD, A&O x 3, PERRL, EOMI, lungs CTAB, cardiac exam NS1S2 no mumur, abd soft and nontender, no pedal edema, skin warm and dry, 2+ radial pulses.  Will continue zofran for nausea. Consult GI team in AM for recommendations in further management of non-diabetic gastroparesis. Pt reports she was planned to start domperidone as an outpatient-- does not appear to be FDA approved in [place].   Agree with plan noted above.

## 2020-07-14 NOTE — PROGRESS NOTE ADULT - PROBLEM SELECTOR PLAN 1
-associated with dysphagia of both solids and liquids resulting in decreased PO intake leading to starvation ketoacidosis  -possibly 2/2 to gastroparesis (which may be 2/2 to hypothyroidism, autoimmune disease) vs CRPS vs hiatal hernia vs ...  -replenish fluids and glucose, c/w 1L infusion of D5 and NaCl  -f/u BMP in AM  -f/u TSH, T4  -f/u GI recs (see if patient will initiate outpatient med Domperidone) -associated originally with dysphagia of both solids and liquids resulting in decreased PO intake leading to starvation ketoacidosis. Currently less dysphagia but patient feels full very quickly and then vomits within an hour after eating or drinking.   -likely 2/2 to gastroparesis given motility study showing poor gastric emptying. (which may be 2/2 to hypothyroidism, autoimmune disease) vs CRPS vs hiatal hernia)  -Will start on erythromycin per GI recs. If starting on domperidone will order EKG prior.   -replenish fluids and glucose, c/w 1L infusion of D5 and NaCl  -Anion gap closed on BMP today.   -f/u TSH, T4  -Will appreciate further GI recs. -associated originally with dysphagia of both solids and liquids resulting in decreased PO intake leading to starvation ketoacidosis. Currently less dysphagia but patient feels full very quickly and then vomits within an hour after eating or drinking.   -likely 2/2 to gastroparesis given motility study showing poor gastric emptying. (which may be 2/2 to hypothyroidism, autoimmune disease) vs CRPS vs hiatal hernia)  -Will start on IV erythromycin per GI recs.  -Will acquire repeat EKG tomorrow given QT prolongation effects of Erythromycin.   -replenish fluids and glucose, c/w 1L infusion of D5 and NaCl  -Anion gap closed on BMP today.   -f/u TSH, T4  -Will appreciate further GI recs.

## 2020-07-14 NOTE — H&P ADULT - PROBLEM SELECTOR PLAN 3
-c/w prozac malikaily -diagnosed at age 4, presented with OCD symptoms, prescribe prozac. Since then, asymptomatic  -c/w prozac 60 q daily

## 2020-07-14 NOTE — CONSULT NOTE ADULT - ASSESSMENT
PATIENT: JABARI BERMUDEZ, MRN: [id number]    18yo F w/ h/o PANDAS, CRPS (complex regional pain syndrome), and recently diagnosed gastroparesis who presents with intractable vomiting since . Patient states on , she was eating rice and felt as if the rice was suck in her throat, causing her to vomit to relieve the sensation. Recently admitted (-) for similar presentation, during which gastric emptying study revealed gastroparesis w/ abnormal liquid and solid emptying. EGD w/ biopsy also done during that admission and was benign, only revealing a sliding hiatal hernia. At discharge, she was only able to tolerate minimal PO ("small sips of water/Gatorade") and instructed to f/u w/ Dr. Wynn as outpatient.     After discharge on , pt became unable to tolerate any PO, stating that eating/drinking anything caused her to either immediately vomit or vomit w/in the next few hours. The emesis comprises of undigested food contents, nonbloody, and she believes that her most recent episode (today ~4AM) contained bile. She also reports lack of appetite, but states that the "getting stuck" feeling has since resolved. She went to see Dr. Wynn who sent her to the hospital for a CT scan to r/o GI obstruction and IVF resuscitation. Of note Dr. Wynn prescribed the pt Domperidone, which the pt is not currently taking, since it is being mailed to her house as the drug is not currently available in the [place]     Patient endorses lack of appetite, abdominal bloating, and a ~7lb weight loss since  . Endorsed fatigue and nausea, but states that this resolved w/ IVF and PRN Zofran (last received ~4AM, following last emesis episode). States that she has never experienced episodes like this before . She denies dysphagia/odynophagia, abdominal pain, diarrhea, constipation (last stool was 3 days ago, well formed, nonbloody), fevers, chills, muscle aches. Denies any recent sick contacts or travel. Denies alcohol or marijuana use.    About 3w prior to hospital admission, states that she had an episode of recurrent mycoplasma pneumonia completing a course of azithromycin (pt has had ~5 episodes of mycoplasma pneumonia since 2019, each requiring a course of azithromycin), as well as a severe flare up of her CRPS (since resolved). Pt has not attempted to take anything to attempt to resolve her vomiting at home. Emesis only triggered by PO intake. Pt has reportedly not been able to take her PO Prozac (for her PANDAS) since , but states that she has not been noticeably effected.    CT A/P revealed no gastric obstruction. Patient given 1L bolus NS, followed by 1L infusion of D5 and NaCl.     REVIEW OF SYSTEMS:    Constitutional:     [ ] negative [ ] fevers [ ] chills [+] weight loss (~7lb/2w) [ ] weight gain  HEENT:                  [X ] negative [ ] dry eyes [ ] eye irritation [ ] postnasal drip [ ] nasal congestion  CV:                         [X ] negative  [ ] chest pain [ ] orthopnea [ ] palpitations [ ] murmur  Resp:                     [X ] negative [ ] cough [ ] shortness of breath [ ] dyspnea [ ] wheezing [ ] sputum [ ] hemoptysis  GI:                          [ ] negative [ ] nausea [+] vomiting [ ] diarrhea [ ] constipation [ ] abd pain [ ] dysphagia   :                        [X ] negative [ ] dysuria [ ] nocturia [ ] hematuria [ ] increased urinary frequency  Musculoskeletal: [X ] negative [ ] back pain [ ] myalgias [ ] arthralgias [ ] fracture  Skin:                       [X ] negative [ ] rash [ ] itch  Neurological:        [X ] negative [ ] headache [ ] dizziness [ ] syncope [ ] weakness [ ] numbness  Psychiatric:           [X ] negative [ ] anxiety [ ] depression  Endocrine:            [X ] negative [ ] diabetes [ ] thyroid problem  Heme/Lymph:      [X] negative [ ] anemia [ ] bleeding problem  Allergic/Immune: [X ] negative [ ] itchy eyes [ ] nasal discharge [ ] hives [ ] angioedema    [ ] All other systems negative  [ ] Unable to assess ROS because ________.    MEDICATIONS:  MEDICATIONS  (STANDING):  dextrose 5% + sodium chloride 0.9%. 1000 milliLiter(s) (100 mL/Hr) IV Continuous <Continuous>  FLUoxetine Solution 60 milliGRAM(s) Oral daily  potassium chloride  10 mEq/100 mL IVPB 10 milliEquivalent(s) IV Intermittent every 1 hour    MEDICATIONS  (PRN):      ALLERGIES: Allergies    Compazine (Angioedema; Dystonic RXN; Anaphylaxis)  metoclopramide (Angioedema; Dystonic RXN; Anaphylaxis)    Intolerances    OBJECTIVE:  ICU Vital Signs Last 24 Hrs  T(C): 36.7 (2020 05:03), Max: 37.3 (2020 13:53)  T(F): 98.1 (2020 05:03), Max: 99.1 (2020 13:53)  HR: 93 (2020 05:03) (88 - 113)  BP: 104/68 (2020 05:03) (104/68 - 126/83)  BP(mean): --  ABP: --  ABP(mean): --  RR: 18 (2020 05:03) (15 - 19)  SpO2: 98% (2020 05:03) (98% - 100%)      I&O's Summary    2020 07:01  -  2020 07:00  --------------------------------------------------------  IN: 1000 mL / OUT: 0 mL / NET: 1000 mL      Daily Height in cm: 162.56 (2020 20:52)    Daily     PHYSICAL EXAMINATION:  General: Comfortable, no acute distress, cooperative with exam.  HEENT: PERRLA, EOMI, moist mucous membranes.  Respiratory: CTAB, normal respiratory effort, no coughing, wheezes, crackles, or rales.  CV: RRR, S1S2, no murmurs, rubs or gallops. No JVD. Distal pulses intact.  Abdominal: Soft, nontender, nondistended, no rebound or guarding, normal bowel sounds.  Neurology: AOx3, no focal neuro defects, MASON x 4.  Extremities: No pitting edema, + Peripheral pulses.  Incisions:   Tubes:    LABS:                          13.5   7.17  )-----------( 364      ( 2020 15:06 )             41.0     07-14    140  |  105  |  6<L>  ----------------------------<  105<H>  3.4<L>   |  22  |  0.77    Ca    8.6      2020 07:15  Phos  3.5     07-13  Mg     2.0     07-13    TPro  8.1  /  Alb  4.8  /  TBili  0.2  /  DBili  x   /  AST  14  /  ALT  11  /  AlkPhos  85  07-13    LIVER FUNCTIONS - ( 2020 15:06 )  Alb: 4.8 g/dL / Pro: 8.1 g/dL / ALK PHOS: 85 U/L / ALT: 11 U/L / AST: 14 U/L / GGT: x           Urinalysis Basic - ( 2020 16:50 )    Color: Yellow / Appearance: Clear / S.030 / pH: x  Gluc: x / Ketone: Large  / Bili: Negative / Urobili: 2 mg/dL   Blood: x / Protein: 30 mg/dL / Nitrite: Negative   Leuk Esterase: Negative / RBC: 4 /hpf / WBC 8 /HPF   Sq Epi: x / Non Sq Epi: 4 /hpf / Bacteria: Moderate    ASSESSMENT  19 year old woman, history of PANDAS, CRPS, and recently diagnosed gastroparesis, who presenting with starvation ketoacidosis 2/2 vomiting since . Recently admitted (-) for similar presentation with a largely benign GI workup.     RECOMMENDATIONS  -C/w PRN antiemetics  -C/w IVF resuscitation as per primary team  -Advance diet as tolerated  Team will f/u w/ Dr. Wynn concerning Domperidone.    All recommendations are not final; PENDING ATTENDING SUSAN PATIENT: JABARI BERMUDEZ, MRN: [id number]    20yo F w/ h/o PANDAS, CRPS (complex regional pain syndrome), and recently diagnosed gastroparesis who presents with intractable vomiting since . Patient states on , she was eating rice and felt as if the rice was suck in her throat, causing her to vomit to relieve the sensation. Recently admitted (-) for similar presentation, during which gastric emptying study revealed gastroparesis w/ abnormal liquid and solid emptying. EGD w/ biopsy also done during that admission and was benign, only revealing a sliding hiatal hernia. At discharge, she was only able to tolerate minimal PO ("small sips of water/Gatorade") and instructed to f/u w/ Dr. Wynn as outpatient.     After discharge on , pt became unable to tolerate any PO, stating that eating/drinking anything caused her to either immediately vomit or vomit w/in the next few hours. The emesis comprises of undigested food contents, nonbloody, and she believes that her most recent episode (today ~4AM) contained bile. She also reports lack of appetite, but states that the "getting stuck" feeling has since resolved. She went to see Dr. Wynn who sent her to the hospital for a CT scan to r/o GI obstruction and IVF resuscitation. Of note Dr. Wynn prescribed the pt Domperidone, which the pt is not currently taking, since it is being mailed to her house as the drug is not currently available in the [place]     Patient endorses lack of appetite, abdominal bloating, and a ~7lb weight loss since  . Endorsed fatigue and nausea, but states that this resolved w/ IVF and PRN Zofran (last received ~4AM, following last emesis episode). States that she has never experienced episodes like this before . She denies dysphagia/odynophagia, abdominal pain, diarrhea, constipation (last stool was 3 days ago, well formed, nonbloody), fevers, chills, muscle aches. Denies any recent sick contacts or travel. Denies alcohol or marijuana use.    About 3w prior to hospital admission, states that she had an episode of recurrent mycoplasma pneumonia completing a course of azithromycin (pt has had ~5 episodes of mycoplasma pneumonia since 2019, each requiring a course of azithromycin), as well as a severe flare up of her CRPS (since resolved). Pt has not attempted to take anything to attempt to resolve her vomiting at home. Emesis only triggered by PO intake. Pt has reportedly not been able to take her PO Prozac (for her PANDAS) since , but states that she has not been noticeably effected.    CT A/P revealed no gastric obstruction. Patient given 1L bolus NS, followed by 1L infusion of D5 and NaCl.     REVIEW OF SYSTEMS:    Constitutional:     [ ] negative [ ] fevers [ ] chills [+] weight loss (~7lb/2w) [ ] weight gain  HEENT:                  [X ] negative [ ] dry eyes [ ] eye irritation [ ] postnasal drip [ ] nasal congestion  CV:                         [X ] negative  [ ] chest pain [ ] orthopnea [ ] palpitations [ ] murmur  Resp:                     [X ] negative [ ] cough [ ] shortness of breath [ ] dyspnea [ ] wheezing [ ] sputum [ ] hemoptysis  GI:                          [ ] negative [ ] nausea [+] vomiting [ ] diarrhea [ ] constipation [ ] abd pain [ ] dysphagia   :                        [X ] negative [ ] dysuria [ ] nocturia [ ] hematuria [ ] increased urinary frequency  Musculoskeletal: [X ] negative [ ] back pain [ ] myalgias [ ] arthralgias [ ] fracture  Skin:                       [X ] negative [ ] rash [ ] itch  Neurological:        [X ] negative [ ] headache [ ] dizziness [ ] syncope [ ] weakness [ ] numbness  Psychiatric:           [X ] negative [ ] anxiety [ ] depression  Endocrine:            [X ] negative [ ] diabetes [ ] thyroid problem  Heme/Lymph:      [X] negative [ ] anemia [ ] bleeding problem  Allergic/Immune: [X ] negative [ ] itchy eyes [ ] nasal discharge [ ] hives [ ] angioedema    [ ] All other systems negative  [ ] Unable to assess ROS because ________.    MEDICATIONS:  MEDICATIONS  (STANDING):  dextrose 5% + sodium chloride 0.9%. 1000 milliLiter(s) (100 mL/Hr) IV Continuous <Continuous>  FLUoxetine Solution 60 milliGRAM(s) Oral daily  potassium chloride  10 mEq/100 mL IVPB 10 milliEquivalent(s) IV Intermittent every 1 hour    MEDICATIONS  (PRN):      ALLERGIES: Allergies    Compazine (Angioedema; Dystonic RXN; Anaphylaxis)  metoclopramide (Angioedema; Dystonic RXN; Anaphylaxis)    Intolerances    OBJECTIVE:  ICU Vital Signs Last 24 Hrs  T(C): 36.7 (2020 05:03), Max: 37.3 (2020 13:53)  T(F): 98.1 (2020 05:03), Max: 99.1 (2020 13:53)  HR: 93 (2020 05:03) (88 - 113)  BP: 104/68 (2020 05:03) (104/68 - 126/83)  BP(mean): --  ABP: --  ABP(mean): --  RR: 18 (2020 05:03) (15 - 19)  SpO2: 98% (2020 05:03) (98% - 100%)      I&O's Summary    2020 07:01  -  2020 07:00  --------------------------------------------------------  IN: 1000 mL / OUT: 0 mL / NET: 1000 mL      Daily Height in cm: 162.56 (2020 20:52)    Daily     PHYSICAL EXAMINATION:  General: Comfortable, no acute distress, cooperative with exam.  HEENT: PERRLA, EOMI, moist mucous membranes.  Respiratory: CTAB, normal respiratory effort, no coughing, wheezes, crackles, or rales.  CV: RRR, S1S2, no murmurs, rubs or gallops. No JVD. Distal pulses intact.  Abdominal: Soft, nontender, nondistended, no rebound or guarding, normal bowel sounds.  Neurology: AOx3, no focal neuro defects, MASON x 4.  Extremities: No pitting edema, + Peripheral pulses.  Incisions:   Tubes:    LABS:                          13.5   7.17  )-----------( 364      ( 2020 15:06 )             41.0     07-14    140  |  105  |  6<L>  ----------------------------<  105<H>  3.4<L>   |  22  |  0.77    Ca    8.6      2020 07:15  Phos  3.5     07-13  Mg     2.0     07-13    TPro  8.1  /  Alb  4.8  /  TBili  0.2  /  DBili  x   /  AST  14  /  ALT  11  /  AlkPhos  85  07-13    LIVER FUNCTIONS - ( 2020 15:06 )  Alb: 4.8 g/dL / Pro: 8.1 g/dL / ALK PHOS: 85 U/L / ALT: 11 U/L / AST: 14 U/L / GGT: x           Urinalysis Basic - ( 2020 16:50 )    Color: Yellow / Appearance: Clear / S.030 / pH: x  Gluc: x / Ketone: Large  / Bili: Negative / Urobili: 2 mg/dL   Blood: x / Protein: 30 mg/dL / Nitrite: Negative   Leuk Esterase: Negative / RBC: 4 /hpf / WBC 8 /HPF   Sq Epi: x / Non Sq Epi: 4 /hpf / Bacteria: Moderate    ASSESSMENT  19 year old woman, history of PANDAS, CRPS, and recently diagnosed gastroparesis, who presenting with starvation ketoacidosis 2/2 vomiting since . Recently admitted (-) for similar presentation with a largely benign GI workup.     RECOMMENDATIONS  -Start IV erythromycin  -Small, frequent meals  -Domperidone can be started when the pt acquires it; however would recommend baseline EKG/cardio eval given medication's cardiac side effect profile    Prisca Solano MD  Internal Medicine PGY-1  GI Consult Service PATIENT: JABARI BERMUDEZ, MRN: [id number]    18yo F w/ h/o PANDAS, CRPS (complex regional pain syndrome), and recently diagnosed gastroparesis who presents with intractable vomiting since . Patient states on , she was eating rice and felt as if the rice was suck in her throat, causing her to vomit to relieve the sensation. Recently admitted (-) for similar presentation, during which gastric emptying study revealed gastroparesis w/ abnormal liquid and solid emptying. EGD w/ biopsy also done during that admission and was benign, only revealing a sliding hiatal hernia. At discharge, she was only able to tolerate minimal PO ("small sips of water/Gatorade") and instructed to f/u w/ Dr. Wynn as outpatient.     After discharge on , pt became unable to tolerate any PO, stating that eating/drinking anything caused her to either immediately vomit or vomit w/in the next few hours. The emesis comprises of undigested food contents, nonbloody, and she believes that her most recent episode (today ~4AM) contained bile. She also reports lack of appetite, but states that the "getting stuck" feeling has since resolved. She went to see Dr. Wynn who sent her to the hospital for a CT scan to r/o GI obstruction and IVF resuscitation. Of note Dr. Wynn prescribed the pt Domperidone, which the pt is not currently taking, since it is being mailed to her house as the drug is not currently available in the [place]     Patient endorses lack of appetite, abdominal bloating, and a ~7lb weight loss since  . Endorsed fatigue and nausea, but states that this resolved w/ IVF and PRN Zofran (last received ~4AM, following last emesis episode). States that she has never experienced episodes like this before . She denies dysphagia/odynophagia, abdominal pain, diarrhea, constipation (last stool was 3 days ago, well formed, nonbloody), fevers, chills, muscle aches. Denies any recent sick contacts or travel. Denies alcohol or marijuana use.    About 3w prior to hospital admission, states that she had an episode of recurrent mycoplasma pneumonia completing a course of azithromycin (pt has had ~5 episodes of mycoplasma pneumonia since 2019, each requiring a course of azithromycin), as well as a severe flare up of her CRPS (since resolved). Pt has not attempted to take anything to attempt to resolve her vomiting at home. Emesis only triggered by PO intake. Pt has reportedly not been able to take her PO Prozac (for her PANDAS) since , but states that she has not been noticeably effected.    CT A/P revealed no gastric obstruction. Patient given 1L bolus NS, followed by 1L infusion of D5 and NaCl.     REVIEW OF SYSTEMS:    Constitutional:     [ ] negative [ ] fevers [ ] chills [+] weight loss (~7lb/2w) [ ] weight gain  HEENT:                  [X ] negative [ ] dry eyes [ ] eye irritation [ ] postnasal drip [ ] nasal congestion  CV:                         [X ] negative  [ ] chest pain [ ] orthopnea [ ] palpitations [ ] murmur  Resp:                     [X ] negative [ ] cough [ ] shortness of breath [ ] dyspnea [ ] wheezing [ ] sputum [ ] hemoptysis  GI:                          [ ] negative [ ] nausea [+] vomiting [ ] diarrhea [ ] constipation [ ] abd pain [ ] dysphagia   :                        [X ] negative [ ] dysuria [ ] nocturia [ ] hematuria [ ] increased urinary frequency  Musculoskeletal: [X ] negative [ ] back pain [ ] myalgias [ ] arthralgias [ ] fracture  Skin:                       [X ] negative [ ] rash [ ] itch  Neurological:        [X ] negative [ ] headache [ ] dizziness [ ] syncope [ ] weakness [ ] numbness  Psychiatric:           [X ] negative [ ] anxiety [ ] depression  Endocrine:            [X ] negative [ ] diabetes [ ] thyroid problem  Heme/Lymph:      [X] negative [ ] anemia [ ] bleeding problem  Allergic/Immune: [X ] negative [ ] itchy eyes [ ] nasal discharge [ ] hives [ ] angioedema    [ ] All other systems negative  [ ] Unable to assess ROS because ________.    MEDICATIONS:  MEDICATIONS  (STANDING):  dextrose 5% + sodium chloride 0.9%. 1000 milliLiter(s) (100 mL/Hr) IV Continuous <Continuous>  FLUoxetine Solution 60 milliGRAM(s) Oral daily  potassium chloride  10 mEq/100 mL IVPB 10 milliEquivalent(s) IV Intermittent every 1 hour    MEDICATIONS  (PRN):      ALLERGIES: Allergies    Compazine (Angioedema; Dystonic RXN; Anaphylaxis)  metoclopramide (Angioedema; Dystonic RXN; Anaphylaxis)    Intolerances    OBJECTIVE:  ICU Vital Signs Last 24 Hrs  T(C): 36.7 (2020 05:03), Max: 37.3 (2020 13:53)  T(F): 98.1 (2020 05:03), Max: 99.1 (2020 13:53)  HR: 93 (2020 05:03) (88 - 113)  BP: 104/68 (2020 05:03) (104/68 - 126/83)  BP(mean): --  ABP: --  ABP(mean): --  RR: 18 (2020 05:03) (15 - 19)  SpO2: 98% (2020 05:03) (98% - 100%)      I&O's Summary    2020 07:01  -  2020 07:00  --------------------------------------------------------  IN: 1000 mL / OUT: 0 mL / NET: 1000 mL      Daily Height in cm: 162.56 (2020 20:52)    Daily     PHYSICAL EXAMINATION:  General: Comfortable, no acute distress, cooperative with exam.  HEENT: PERRLA, EOMI, moist mucous membranes.  Respiratory: CTAB, normal respiratory effort, no coughing, wheezes, crackles, or rales.  CV: RRR, S1S2, no murmurs, rubs or gallops. No JVD. Distal pulses intact.  Abdominal: Soft, nontender, nondistended, no rebound or guarding, normal bowel sounds.  Neurology: AOx3, no focal neuro defects, MASON x 4.  Extremities: No pitting edema, + Peripheral pulses.  Incisions:   Tubes:    LABS:                          13.5   7.17  )-----------( 364      ( 2020 15:06 )             41.0     07-14    140  |  105  |  6<L>  ----------------------------<  105<H>  3.4<L>   |  22  |  0.77    Ca    8.6      2020 07:15  Phos  3.5     07-13  Mg     2.0     07-13    TPro  8.1  /  Alb  4.8  /  TBili  0.2  /  DBili  x   /  AST  14  /  ALT  11  /  AlkPhos  85  07-13    LIVER FUNCTIONS - ( 2020 15:06 )  Alb: 4.8 g/dL / Pro: 8.1 g/dL / ALK PHOS: 85 U/L / ALT: 11 U/L / AST: 14 U/L / GGT: x           Urinalysis Basic - ( 2020 16:50 )    Color: Yellow / Appearance: Clear / S.030 / pH: x  Gluc: x / Ketone: Large  / Bili: Negative / Urobili: 2 mg/dL   Blood: x / Protein: 30 mg/dL / Nitrite: Negative   Leuk Esterase: Negative / RBC: 4 /hpf / WBC 8 /HPF   Sq Epi: x / Non Sq Epi: 4 /hpf / Bacteria: Moderate    ASSESSMENT  19 year old woman, history of PANDAS, CRPS, and recently diagnosed gastroparesis, who presenting with starvation ketoacidosis 2/2 vomiting since . Recently admitted (-) for similar presentation with a largely benign GI workup.     RECOMMENDATIONS  -Start IV erythromycin  -Small, frequent meals  -Domperidone can be started when the pt acquires it; however would recommend baseline EKG/cardio eval before initiation, given medication's cardiac side effect profile    Prisca Solano MD  Internal Medicine PGY-1  GI Consult Service PATIENT: JABARI BERMUDEZ, MRN: [id number]    18yo F w/ h/o PANDAS, CRPS (complex regional pain syndrome), and recently diagnosed gastroparesis who presents with intractable vomiting since . Patient states on , she was eating rice and felt as if the rice was suck in her throat, causing her to vomit to relieve the sensation. Recently admitted (-) for similar presentation, during which gastric emptying study revealed gastroparesis w/ abnormal liquid and solid emptying. EGD w/ biopsy also done during that admission and was benign, only revealing a sliding hiatal hernia. At discharge, she was only able to tolerate minimal PO ("small sips of water/Gatorade") and instructed to f/u w/ Dr. Wynn as outpatient.     After discharge on , pt became unable to tolerate any PO, stating that eating/drinking anything caused her to either immediately vomit or vomit w/in the next few hours. The emesis comprises of undigested food contents, nonbloody, and she believes that her most recent episode (today ~4AM) contained bile. She also reports lack of appetite, but states that the "getting stuck" feeling has since resolved. She went to see Dr. Wynn who sent her to the hospital for a CT scan to r/o GI obstruction and IVF resuscitation. Of note Dr. Wynn prescribed the pt Domperidone, which the pt is not currently taking, since it is being mailed to her house as the drug is not currently available in the [place]     Patient endorses lack of appetite, abdominal bloating, and a ~7lb weight loss since  . Endorsed fatigue and nausea, but states that this resolved w/ IVF and PRN Zofran (last received ~4AM, following last emesis episode). States that she has never experienced episodes like this before . She denies dysphagia/odynophagia, abdominal pain, diarrhea, constipation (last stool was 3 days ago, well formed, nonbloody), fevers, chills, muscle aches. Denies any recent sick contacts or travel. Denies alcohol or marijuana use.    About 3w prior to hospital admission, states that she had an episode of recurrent mycoplasma pneumonia completing a course of azithromycin (pt has had ~5 episodes of mycoplasma pneumonia since 2019, each requiring a course of azithromycin), as well as a severe flare up of her CRPS (since resolved). Pt has not attempted to take anything to attempt to resolve her vomiting at home. Emesis only triggered by PO intake. Pt has reportedly not been able to take her PO Prozac (for her PANDAS) since , but states that she has not been noticeably effected.    CT A/P revealed no gastric obstruction. Patient given 1L bolus NS, followed by 1L infusion of D5 and NaCl.       PAST MEDICAL HISTORY:  Anxiety     PANDAS (pediatric autoimmune neuropsychiatric disease associated with streptococcal infection).     PAST SURGICAL HISTORY:  No significant past surgical history.     FAMILY HISTORY:  No pertinent family history in first degree relatives.     No Pertinent Family History in first degree relatives of: denies.     Social History:  Social History (marital status, living situation, occupation, tobacco use, alcohol and drug use, and sexual history): Nursing student at [employer]  Strong support network, lives with parents  Never smoker, denies alcohol or drug use. No marijuana Sexually active	      REVIEW OF SYSTEMS:    Constitutional:     [ ] negative [ ] fevers [ ] chills [+] weight loss (~7lb/2w) [ ] weight gain  HEENT:                  [X ] negative [ ] dry eyes [ ] eye irritation [ ] postnasal drip [ ] nasal congestion  CV:                         [X ] negative  [ ] chest pain [ ] orthopnea [ ] palpitations [ ] murmur  Resp:                     [X ] negative [ ] cough [ ] shortness of breath [ ] dyspnea [ ] wheezing [ ] sputum [ ] hemoptysis  GI:                          [ ] negative [ ] nausea [+] vomiting [ ] diarrhea [ ] constipation [ ] abd pain [ ] dysphagia   :                        [X ] negative [ ] dysuria [ ] nocturia [ ] hematuria [ ] increased urinary frequency  Musculoskeletal: [X ] negative [ ] back pain [ ] myalgias [ ] arthralgias [ ] fracture  Skin:                       [X ] negative [ ] rash [ ] itch  Neurological:        [X ] negative [ ] headache [ ] dizziness [ ] syncope [ ] weakness [ ] numbness  Psychiatric:           [X ] negative [ ] anxiety [ ] depression  Endocrine:            [X ] negative [ ] diabetes [ ] thyroid problem  Heme/Lymph:      [X] negative [ ] anemia [ ] bleeding problem  Allergic/Immune: [X ] negative [ ] itchy eyes [ ] nasal discharge [ ] hives [ ] angioedema    [ ] All other systems negative  [ ] Unable to assess ROS because ________.    MEDICATIONS:  MEDICATIONS  (STANDING):  dextrose 5% + sodium chloride 0.9%. 1000 milliLiter(s) (100 mL/Hr) IV Continuous <Continuous>  FLUoxetine Solution 60 milliGRAM(s) Oral daily  potassium chloride  10 mEq/100 mL IVPB 10 milliEquivalent(s) IV Intermittent every 1 hour    MEDICATIONS  (PRN):      ALLERGIES: Allergies    Compazine (Angioedema; Dystonic RXN; Anaphylaxis)  metoclopramide (Angioedema; Dystonic RXN; Anaphylaxis)    Intolerances    OBJECTIVE:  ICU Vital Signs Last 24 Hrs  T(C): 36.7 (2020 05:03), Max: 37.3 (2020 13:53)  T(F): 98.1 (2020 05:03), Max: 99.1 (2020 13:53)  HR: 93 (2020 05:03) (88 - 113)  BP: 104/68 (2020 05:03) (104/68 - 126/83)  BP(mean): --  ABP: --  ABP(mean): --  RR: 18 (2020 05:03) (15 - 19)  SpO2: 98% (2020 05:03) (98% - 100%)      I&O's Summary    2020 07:01  -  2020 07:00  --------------------------------------------------------  IN: 1000 mL / OUT: 0 mL / NET: 1000 mL      Daily Height in cm: 162.56 (2020 20:52)    Daily     PHYSICAL EXAMINATION:  General: Comfortable, no acute distress, cooperative with exam.  HEENT: PERRLA, EOMI, moist mucous membranes.  Respiratory: CTAB, normal respiratory effort, no coughing, wheezes, crackles, or rales.  CV: RRR, S1S2, no murmurs, rubs or gallops. No JVD. Distal pulses intact.  Abdominal: Soft, nontender, nondistended, no rebound or guarding, normal bowel sounds.  Neurology: AOx3, no focal neuro defects, MASON x 4.  Extremities: No pitting edema, + Peripheral pulses.  Incisions:   Tubes:    LABS:                          13.5   7.17  )-----------( 364      ( 2020 15:06 )             41.0     07-14    140  |  105  |  6<L>  ----------------------------<  105<H>  3.4<L>   |  22  |  0.77    Ca    8.6      2020 07:15  Phos  3.5     07-13  Mg     2.0     07-13    TPro  8.1  /  Alb  4.8  /  TBili  0.2  /  DBili  x   /  AST  14  /  ALT  11  /  AlkPhos  85  07-13    LIVER FUNCTIONS - ( 2020 15:06 )  Alb: 4.8 g/dL / Pro: 8.1 g/dL / ALK PHOS: 85 U/L / ALT: 11 U/L / AST: 14 U/L / GGT: x           Urinalysis Basic - ( 2020 16:50 )    Color: Yellow / Appearance: Clear / S.030 / pH: x  Gluc: x / Ketone: Large  / Bili: Negative / Urobili: 2 mg/dL   Blood: x / Protein: 30 mg/dL / Nitrite: Negative   Leuk Esterase: Negative / RBC: 4 /hpf / WBC 8 /HPF   Sq Epi: x / Non Sq Epi: 4 /hpf / Bacteria: Moderate          ASSESSMENT  19 year old woman, history of PANDAS, CRPS, and recently diagnosed gastroparesis, who presenting with starvation ketoacidosis 2/2 vomiting since . Recently admitted (-) for similar presentation with a largely benign GI workup other than gastroparesis    RECOMMENDATIONS  -Start IV erythromycin; QTc recently is low 439  -Small, frequent meals  -Domperidone can be started when the pt acquires it; however would recommend baseline EKG/cardio eval before initiation, given medication's cardiac side effect profile      Prisca Solano MD  Internal Medicine PGY-1  GI Consult Service

## 2020-07-14 NOTE — H&P ADULT - NSHPREVIEWOFSYSTEMS_GEN_ALL_CORE
REVIEW OF SYSTEMS:    CONSTITUTIONAL: +dizziness, no weakness, fevers or chills  EYES/ENT: No visual changes;  No vertigo or throat pain   NECK: No pain or stiffness  RESPIRATORY: No cough, wheezing, hemoptysis; No shortness of breath  CARDIOVASCULAR: No chest pain or palpitations  GASTROINTESTINAL: No abdominal or epigastric pain. No nausea, vomiting, or hematemesis; No diarrhea or constipation. No melena or hematochezia.  BACK: No CVA tenderness  GENITOURINARY: No dysuria, frequency or hematuria  NEUROLOGICAL: +dizziness, No numbness or weakness  SKIN: No itching, rashes REVIEW OF SYSTEMS:    CONSTITUTIONAL: +dizziness, 7 pound weight loss since July 3rd, no weakness, fevers or chills, no night sweats, no joint pain  EYES/ENT: No visual changes;  No throat pain outside of swallowing  NECK: No pain   RESPIRATORY: No cough, wheezing, hemoptysis; No shortness of breath  CARDIOVASCULAR: No chest pain or palpitations  GASTROINTESTINAL: No abdominal or epigastric pain. No nausea or hematemesis; No diarrhea or constipation. No melena or hematochezia.  BACK: No CVA tenderness  GENITOURINARY: No dysuria, frequency or hematuria  NEUROLOGICAL:  No numbness or weakness  SKIN: No itching, rashes REVIEW OF SYSTEMS:    CONSTITUTIONAL: +dizziness, 7 pound weight loss since July 3rd, no weakness, fevers or chills, no night sweats, no joint pain  EYES/ENT: No visual changes;  No throat pain outside of swallowing  NECK: No pain or swelling  RESPIRATORY: No cough, wheezing, hemoptysis; No shortness of breath  CARDIOVASCULAR: No chest pain or palpitations  GASTROINTESTINAL: No abdominal or epigastric pain. + emesis. no hematemesis; No diarrhea or constipation. No melena or hematochezia.  BACK: No CVA tenderness  GENITOURINARY: No dysuria, frequency or hematuria  NEUROLOGICAL:  No numbness or weakness  SKIN: No itching, rashes

## 2020-07-14 NOTE — H&P ADULT - PROBLEM SELECTOR PLAN 4
-diagnosed at age 4, presented with OCD symptoms, prescribe prozac. Since then, asymptomatic  -c/w prozac 60 qdaily -c/w prozac malikaily

## 2020-07-14 NOTE — PROGRESS NOTE ADULT - SUBJECTIVE AND OBJECTIVE BOX
PROGRESS NOTE:   Authoted by Shorty Ponce MD, PGY1 Pager [phone]    Patient is a 19y old  Female who presents with a chief complaint of vomiting (2020 01:56)      SUBJECTIVE / OVERNIGHT EVENTS:     REVIEW OF SYSTEMS:    CONSTITUTIONAL: No weakness, fevers or chills  EYES/ENT: No visual changes;  No vertigo or throat pain   NECK: No pain or stiffness  RESPIRATORY: No cough, wheezing, hemoptysis; No shortness of breath  CARDIOVASCULAR: No chest pain or palpitations  GASTROINTESTINAL: No abdominal or epigastric pain. No nausea, vomiting, or hematemesis; No diarrhea or constipation. No melena or hematochezia.  GENITOURINARY: No dysuria, frequency or hematuria  NEUROLOGICAL: No numbness or weakness  SKIN: No itching, rashes    MEDICATIONS  (STANDING):  dextrose 5% + sodium chloride 0.9%. 1000 milliLiter(s) (100 mL/Hr) IV Continuous <Continuous>  FLUoxetine Solution 60 milliGRAM(s) Oral daily    MEDICATIONS  (PRN):      CAPILLARY BLOOD GLUCOSE        I&O's Summary    2020 07:01  -  2020 07:00  --------------------------------------------------------  IN: 1000 mL / OUT: 0 mL / NET: 1000 mL        PHYSICAL EXAM:  Vital Signs Last 24 Hrs  T(C): 36.7 (2020 05:03), Max: 37.3 (2020 13:53)  T(F): 98.1 (2020 05:03), Max: 99.1 (2020 13:53)  HR: 93 (2020 05:03) (88 - 113)  BP: 104/68 (2020 05:03) (104/68 - 126/83)  BP(mean): --  RR: 18 (2020 05:03) (15 - 19)  SpO2: 98% (2020 05:03) (98% - 100%)    CONSTITUTIONAL: NAD, well-developed  RESPIRATORY: Normal respiratory effort; lungs are clear to auscultation bilaterally  CARDIOVASCULAR: Regular rate and rhythm, normal S1 and S2, no murmur/rub/gallop; No lower extremity edema; Peripheral pulses are 2+ bilaterally  ABDOMEN: Nontender to palpation, normoactive bowel sounds, no rebound/guarding; No hepatosplenomegaly  MUSCLOSKELETAL: no clubbing or cyanosis of digits; no joint swelling or tenderness to palpation  PSYCH: A+O to person, place, and time; affect appropriate  NEURO: Non-focal, no tremors  SKIN: No rashes    LABS:                        13.5   7.17  )-----------( 364      ( 2020 15:06 )             41.0     07-14    140  |  105  |  6<L>  ----------------------------<  105<H>  3.4<L>   |  22  |  0.77    Ca    8.6      2020 07:15  Phos  3.5     07-13  Mg     2.0     07-13    TPro  8.1  /  Alb  4.8  /  TBili  0.2  /  DBili  x   /  AST  14  /  ALT  11  /  AlkPhos  85  07-13          Urinalysis Basic - ( 2020 16:50 )    Color: Yellow / Appearance: Clear / S.030 / pH: x  Gluc: x / Ketone: Large  / Bili: Negative / Urobili: 2 mg/dL   Blood: x / Protein: 30 mg/dL / Nitrite: Negative   Leuk Esterase: Negative / RBC: 4 /hpf / WBC 8 /HPF   Sq Epi: x / Non Sq Epi: 4 /hpf / Bacteria: Moderate          RADIOLOGY & ADDITIONAL TESTS:  No new imaging or tests    COORDINATION OF CARE:  Care Discussed with Consultants/Other Providers [Y/N]:  Prior or Outpatient Records Reviewed [Y/N]: PROGRESS NOTE:   Authoted by Shorty Ponce MD, PGY1 Pager [phone]    Patient is a 19y old  Female who presents with a chief complaint of vomiting (2020 01:56)      SUBJECTIVE / OVERNIGHT EVENTS: No acute events overnight. Patient notes that when she isn't eating, she feels fine. When she tries to eat anything, she vomits it up. She denies fevers, night sweats, nausea, diarrhea (last bowel movement 3 days ago, formed), abdominal pain, chest pain, or SOB.     REVIEW OF SYSTEMS:    CONSTITUTIONAL: No weakness, fevers or chills  EYES/ENT: No visual changes;  No vertigo or throat pain   NECK: No pain or stiffness  RESPIRATORY: No cough, wheezing, hemoptysis; No shortness of breath  CARDIOVASCULAR: No chest pain or palpitations  GASTROINTESTINAL: +vomiting. No abdominal or epigastric pain. No nausea or hematemesis; No diarrhea or constipation. No melena or hematochezia.  GENITOURINARY: No dysuria, frequency or hematuria  NEUROLOGICAL: No numbness or weakness  SKIN: No itching, rashes    MEDICATIONS  (STANDING):  dextrose 5% + sodium chloride 0.9%. 1000 milliLiter(s) (100 mL/Hr) IV Continuous <Continuous>  FLUoxetine Solution 60 milliGRAM(s) Oral daily    MEDICATIONS  (PRN):      CAPILLARY BLOOD GLUCOSE        I&O's Summary    2020 07:01  -  2020 07:00  --------------------------------------------------------  IN: 1000 mL / OUT: 0 mL / NET: 1000 mL        PHYSICAL EXAM:  Vital Signs Last 24 Hrs  T(C): 36.7 (2020 05:03), Max: 37.3 (2020 13:53)  T(F): 98.1 (2020 05:03), Max: 99.1 (2020 13:53)  HR: 93 (2020 05:03) (88 - 113)  BP: 104/68 (2020 05:03) (104/68 - 126/83)  BP(mean): --  RR: 18 (2020 05:03) (15 - 19)  SpO2: 98% (2020 05:03) (98% - 100%)    CONSTITUTIONAL: NAD, well-developed  RESPIRATORY: Normal respiratory effort; lungs are clear to auscultation bilaterally  CARDIOVASCULAR: Regular rate and rhythm, normal S1 and S2, no murmur/rub/gallop; No lower extremity edema; Peripheral pulses are 2+ bilaterally  ABDOMEN: Nontender to palpation, normoactive bowel sounds, no rebound/guarding; No hepatosplenomegaly  MUSCLOSKELETAL: no clubbing or cyanosis of digits; no joint swelling or tenderness to palpation  PSYCH: A+O to person, place, and time; affect appropriate  NEURO: Non-focal, no tremors  SKIN: No rashes    LABS:                        13.5   7.17  )-----------( 364      ( 2020 15:06 )             41.0     07-14    140  |  105  |  6<L>  ----------------------------<  105<H>  3.4<L>   |  22  |  0.77    Ca    8.6      2020 07:15  Phos  3.5     07-13  Mg     2.0     07-13    TPro  8.1  /  Alb  4.8  /  TBili  0.2  /  DBili  x   /  AST  14  /  ALT  11  /  AlkPhos  85  07-13          Urinalysis Basic - ( 2020 16:50 )    Color: Yellow / Appearance: Clear / S.030 / pH: x  Gluc: x / Ketone: Large  / Bili: Negative / Urobili: 2 mg/dL   Blood: x / Protein: 30 mg/dL / Nitrite: Negative   Leuk Esterase: Negative / RBC: 4 /hpf / WBC 8 /HPF   Sq Epi: x / Non Sq Epi: 4 /hpf / Bacteria: Moderate          RADIOLOGY & ADDITIONAL TESTS:  No new imaging or tests    COORDINATION OF CARE:  Care Discussed with Consultants/Other Providers [Y/N]:  Prior or Outpatient Records Reviewed [Y/N]:

## 2020-07-14 NOTE — H&P ADULT - NSHPPHYSICALEXAM_GEN_ALL_CORE
Vital Signs Last 24 Hrs  T(C): 36.6 (13 Jul 2020 20:52), Max: 37.3 (13 Jul 2020 13:53)  T(F): 97.9 (13 Jul 2020 20:52), Max: 99.1 (13 Jul 2020 13:53)  HR: 102 (13 Jul 2020 20:52) (88 - 113)  BP: 126/83 (13 Jul 2020 20:52) (109/62 - 126/83)  BP(mean): --  RR: 16 (13 Jul 2020 20:52) (15 - 19)  SpO2: 100% (13 Jul 2020 20:52) (98% - 100%)    PHYSICAL EXAM:  GENERAL: No acute distress, well-developed, resting comfortably in bed   HEAD:  Atraumatic, Normocephalic  EYES: EOMI, conjunctiva and sclera clear  NECK: Supple, no lymphadenopathy, no JVD  CHEST/LUNG: CTAB; No wheezes, rales, or rhonchi  HEART: Regular rate and rhythm; No murmurs, rubs, or gallops  ABDOMEN: Soft, non-tender, non-distended; faint, infrequent bowel sounds, no organomegaly  EXTREMITIES:  No cyanosis, or edema  NEUROLOGY: A&O x 3, no focal deficits  SKIN: No rashes or lesions

## 2020-07-14 NOTE — H&P ADULT - NSHPSOCIALHISTORY_GEN_ALL_CORE
Nursing student at [employer]  Strong support network, lives with parents  Never smoker, denies alcohol or drug use  Sexually active

## 2020-07-14 NOTE — PROGRESS NOTE ADULT - ASSESSMENT
19 year old woman, history of PANDAS, CRPS (complex regional pain syndrome), gastroparesis, and anxiety, who presents with starvation ketoacidosis 2/2 vomiting and associated dysphagia since July 3rd, with a largely benign GI workup.

## 2020-07-14 NOTE — H&P ADULT - PROBLEM SELECTOR PLAN 2
-diagnosed at age 9, severe pain after breaking 5th metatarsal, now most recently presenting with intermittent severe pain of LLE and LUE. -diagnosed at age 9, severe pain after breaking 5th metatarsal, now most recently presenting with intermittent severe pain of LLE and LUE.  -hold depakote and topamax until patient can tolerate PO

## 2020-07-14 NOTE — H&P ADULT - HISTORY OF PRESENT ILLNESS
19 year old woman, history of PANDAS, CRPS (complex regional pain syndrome), gastroparesis, and anxiety, who presents with intractable vomiting since July 3rd. Patient states on July 3rd, she was eating rice and felt as if the rice was suck in her throat, causing her to vomit to relieve the sensation. Since then, patient been unable to tolerate both solids and liquids, reporting persistent sensation of "food getting stuck" in her throat. Patient states she has multiple episodes of emesis daily, which occur whenever she attempts to eat. The emesis comprises of food contents, and most recently has been green. Patient denies hematemesis. Patient denies nausea, denies abdominal pain except for intermittent "belly spasms," endorses abdominal bloating, denies diarrhea, constipation (last stool was 2 days ago, well formed, nonbloody). 19 year old woman, history of PANDAS, CRPS (complex regional pain syndrome), gastroparesis, and anxiety, who presents with intractable vomiting since July 3rd. Patient states on July 3rd, she was eating rice and felt as if the rice was suck in her throat, causing her to vomit to relieve the sensation. Since then, patient been unable to tolerate both solids and liquids, reporting persistent sensation of "food getting stuck" in her throat. Patient states she has multiple episodes of emesis daily, which occur whenever she attempts to eat. The emesis comprises of food contents, and most recently has been green. Patient denies hematemesis. Patient denies nausea, denies abdominal pain except for intermittent "belly spasms," endorses abdominal bloating, denies diarrhea, denies constipation (last stool was 2 days ago, well formed, nonbloody). Patient has never experienced episodes like this before, and notes that the sensastion abruptly started on July 3rd.   Patient states that three years ago, she experienced episodes of extreme nausea, vomiting and lethargy and was prescribed depakote and topamax, with relief of symptoms. Patient states vomiting episodes she is experiencing now are different than her prior episodes, and her symptoms have not been relieved by depakote or topamax. 19 year old woman, history of PANDAS, CRPS (complex regional pain syndrome), gastroparesis, and anxiety, who presents with intractable vomiting since July 3rd. Patient states on July 3rd, she was eating rice and felt as if the rice was suck in her throat, causing her to vomit to relieve the sensation. Since then, patient been unable to tolerate both solids and liquids, reporting persistent sensation of "food getting stuck" in her throat. Patient states she has multiple episodes of emesis daily, which occur whenever she attempts to eat. The emesis comprises of food contents, and most recently has been green. Patient denies hematemesis. Patient denies nausea, denies abdominal pain except for intermittent "belly spasms," endorses abdominal bloating, denies diarrhea, denies constipation (last stool was 2 days ago, well formed, nonbloody). Patient has never experienced episodes like this before, and notes that symptoms abruptly started on July 3rd.   Patient was hospitalized at [place] from July 6th-9th,   Patient states that three years ago, she experienced episodes of extreme nausea, vomiting and lethargy and was prescribed depakote and topamax, with relief of symptoms. Patient states the vomiting episodes she is experiencing now are different than her prior episodes, and her symptoms have not been relieved by depakote or topamax. 19 year old woman, history of PANDAS, CRPS (complex regional pain syndrome), gastroparesis, and anxiety, who presents with intractable vomiting since July 3rd. Patient states on July 3rd, she was eating rice and felt as if the rice was suck in her throat, causing her to vomit to relieve the sensation. Since then, patient been unable to tolerate both solids and liquids, reporting persistent sensation of "food getting stuck" in her throat. Patient states she has multiple episodes of emesis daily, which occur whenever she attempts to eat. The emesis comprises of food contents, and most recently has been green. Patient denies hematemesis. Patient denies nausea, denies abdominal pain except for intermittent "belly spasms," endorses abdominal bloating, denies diarrhea, denies constipation (last stool was 2 days ago, well formed, nonbloody).  Patient has never experienced episodes like this before, and notes that symptoms abruptly started on July 3rd.   Patient was hospitalized at [place] from July 6th-9th for similar symptoms, and underwent underwent EGD which revealed a sliding hiatal hernia, but otherwise no esophageal anatomic abnormality; biopsies were negative for eosonophilic esophagitis; a gastric emptying study revealed gastroparesis (abnormal emptying of liquid and solid),     Patient states that three years ago, she experienced episodes of extreme nausea, vomiting and lethargy and was prescribed depakote and topamax, with relief of symptoms. Patient states the vomiting episodes she is experiencing now are different than her prior episodes, and her current symptoms have not been relieved by depakote or topamax.       In the ED, CT A/P revealed no gastric obstruction, 19 year old woman, history of PANDAS, CRPS (complex regional pain syndrome), gastroparesis, and anxiety, who presents with intractable vomiting since July 3rd. Patient states on July 3rd, she was eating rice and felt as if the rice was suck in her throat, causing her to vomit to relieve the sensation. Since then, patient been unable to tolerate both solids and liquids, reporting persistent sensation of "food getting stuck" in her throat. Patient states she has multiple episodes of emesis daily, which occur whenever she attempts to eat. The emesis comprises of food contents, and most recently has been green. Patient denies hematemesis. Patient denies nausea, denies abdominal pain except for intermittent "belly spasms," endorses abdominal bloating, denies diarrhea, denies constipation (last stool was 2 days ago, well formed, nonbloody).  Patient has never experienced episodes like this before, and notes that symptoms abruptly started on July 3rd. Patient states that three years ago, she experienced episodes of extreme nausea, vomiting and lethargy and was prescribed depakote and topamax, with relief of symptoms. Patient states the vomiting episodes she is experiencing now are different than her prior episodes, and her current symptoms have not been relieved by depakote or topamax.      Patient was hospitalized at [facility] from July 6th-9th for similar symptoms, and underwent underwent EGD which revealed a sliding hiatal hernia, but otherwise no esophageal anatomic abnormality; biopsies were benign (including negative for eosonophilic esophagitis); a gastric emptying study revealed gastroparesis (abnormal emptying of liquid and solid). Patient states she was following with outpatient GI Dr. Wynn and was due to start Domperidone, but came to hospital before initiating treatment d/t persistent vomting.     Prior to July 3rd, patient was in usual state of health, endorsing only recurrent mycoplasma pneumonia in the past few months, recently completing a course of azithromycin.       In the ED, patient was afebrile at 99.1, HR , -121/55-90, RR 15-19, saturating 99% on RA. Patient had FS of 60, AG 21, CBC unremarkable, FS of 60,     In the ED, CT A/P revealed no gastric obstruction, 19 year old woman, history of PANDAS, CRPS (complex regional pain syndrome), gastroparesis, and anxiety, who presents with intractable vomiting since July 3rd. Patient states on July 3rd, she was eating rice and felt as if the rice was suck in her throat, causing her to vomit to relieve the sensation. Since then, patient been unable to tolerate both solids and liquids, reporting persistent sensation of "food getting stuck" in her throat. Patient states she has multiple episodes of emesis daily, which occur whenever she attempts to eat. The emesis comprises of food contents, and most recently has been green. Patient denies hematemesis. Patient denies nausea, denies abdominal pain except for intermittent "belly spasms," endorses abdominal bloating, denies diarrhea, denies constipation (last stool was 2 days ago, well formed, nonbloody).  Patient has never experienced episodes like this before, and notes that symptoms abruptly started on July 3rd. Patient states that three years ago, she experienced episodes of extreme nausea, vomiting and lethargy and was prescribed depakote and topamax, with relief of symptoms. Patient states the vomiting episodes she is experiencing now are different than her prior episodes, and her current symptoms have not been relieved by depakote or topamax.      Patient was hospitalized at [place] from July 6th-9th for similar symptoms, and underwent underwent EGD which revealed a sliding hiatal hernia, but otherwise no esophageal anatomic abnormality; biopsies were benign (including negative for eosonophilic esophagitis); a gastric emptying study revealed gastroparesis (abnormal emptying of liquid and solid). Patient states she was following with outpatient GI Dr. Wynn and was due to start Domperidone, but came to hospital before initiating treatment d/t persistent vomting.     Prior to July 3rd, patient was in usual state of health, endorsing only recurrent mycoplasma pneumonia in the past few months, recently completing a course of azithromycin.     In the ED, patient was afebrile at 99.1, HR , -121/55-90, RR 15-19, saturating 99% on RA. Patient had FS of 60, AG 21, +ketones in urine,  CBC and BMP otherwise unremarkable.  CT A/P revealed no gastric obstruction. 19 year old woman, history of PANDAS, CRPS (complex regional pain syndrome), gastroparesis, and anxiety, who presents with intractable vomiting since July 3rd. Patient states on July 3rd, she was eating rice and felt as if the rice was suck in her throat, causing her to vomit to relieve the sensation. Since then, patient been unable to tolerate both solids and liquids, reporting persistent sensation of "food getting stuck" in her throat. Patient states she has multiple episodes of emesis daily, which occur whenever she attempts to eat. The emesis comprises of food contents, and most recently has been green. Patient denies hematemesis. Patient denies nausea, denies abdominal pain except for intermittent "belly spasms," endorses abdominal bloating, denies diarrhea, denies constipation (last stool was 2 days ago, well formed, nonbloody).  Patient has never experienced episodes like this before, and notes that symptoms abruptly started on July 3rd. Patient states that three years ago, she experienced episodes of extreme nausea, vomiting and lethargy and was prescribed depakote and topamax, with relief of symptoms. Patient states the vomiting episodes she is experiencing now are different than her prior episodes, and her current symptoms have not been relieved by depakote or topamax.      Patient was hospitalized at [place] from July 6th-9th for similar symptoms, and underwent underwent EGD which revealed a sliding hiatal hernia, but otherwise no esophageal anatomic abnormality; biopsies were benign (including negative for eosonophilic esophagitis); a gastric emptying study revealed gastroparesis (abnormal emptying of liquid and solid). Patient states she was following with outpatient GI Dr. Wynn and was due to start Domperidone, but came to hospital before initiating treatment d/t persistent vomting.     Prior to July 3rd, patient was in usual state of health, endorsing only recurrent mycoplasma pneumonia in the past few months, recently completing a course of azithromycin.     In the ED, patient was afebrile at 99.1, HR , -121/55-90, RR 15-19, saturating 99% on RA. Patient had FS of 60, AG 21, +ketones in urine,  CBC and BMP otherwise unremarkable.  CT A/P revealed no gastric obstruction. Patient given 1L bolus NS, followed by 1L infusion of D5 and NaCl.

## 2020-07-14 NOTE — H&P ADULT - ASSESSMENT
19 year old woman, history of PANDAS, CRPS (complex regional pain syndrome), gastroparesis, and anxiety, who presents with intractable vomiting since July 3rd, associated with dysphagia and largely benign GI workup. 19 year old woman, history of PANDAS, CRPS (complex regional pain syndrome), gastroparesis, and anxiety, who presents with starvation ketoacidosis 2/2 vomiting and associated dysphagia since July 3rd, with a largely benign GI workup.

## 2020-07-14 NOTE — CONSULT NOTE ADULT - ATTENDING COMMENTS
Patient seen and examiend. Agree with above. Recent EGD/CT negative, gastric emptying shows gastroparesis. Can use PRN Erythromycin for now - she reports dystonic reactions to Reglan before. Explained tachyphylaxis of erythromycin. Advance to small frequent meals. If able to get domperidone (patient reports she has it ordered/shipped), we can try that instead. Discussed the risks effects of potential cardiac arrhythmias, and that she will need to be monitored cardiology-wise if she was to start this.    If all these do not improve her symptoms, consider mirtazapine which has been reported in small series to have benefit, and G-POEM evaluation as outpatient.

## 2020-07-14 NOTE — H&P ADULT - PROBLEM SELECTOR PROBLEM 4
PANDAS (pediatric autoimmune neuropsychiatric disease associated with streptococcal infection) Anxiety

## 2020-07-15 ENCOUNTER — TRANSCRIPTION ENCOUNTER (OUTPATIENT)
Age: 20
End: 2020-07-15

## 2020-07-15 VITALS
DIASTOLIC BLOOD PRESSURE: 81 MMHG | SYSTOLIC BLOOD PRESSURE: 126 MMHG | RESPIRATION RATE: 18 BRPM | TEMPERATURE: 98 F | OXYGEN SATURATION: 98 % | HEART RATE: 89 BPM

## 2020-07-15 LAB
ANION GAP SERPL CALC-SCNC: 12 MMOL/L — SIGNIFICANT CHANGE UP (ref 5–17)
BUN SERPL-MCNC: <4 MG/DL — LOW (ref 7–23)
CALCIUM SERPL-MCNC: 9.3 MG/DL — SIGNIFICANT CHANGE UP (ref 8.4–10.5)
CHLORIDE SERPL-SCNC: 105 MMOL/L — SIGNIFICANT CHANGE UP (ref 96–108)
CO2 SERPL-SCNC: 24 MMOL/L — SIGNIFICANT CHANGE UP (ref 22–31)
CREAT SERPL-MCNC: 0.85 MG/DL — SIGNIFICANT CHANGE UP (ref 0.5–1.3)
GLUCOSE SERPL-MCNC: 95 MG/DL — SIGNIFICANT CHANGE UP (ref 70–99)
POTASSIUM SERPL-MCNC: 3.8 MMOL/L — SIGNIFICANT CHANGE UP (ref 3.5–5.3)
POTASSIUM SERPL-SCNC: 3.8 MMOL/L — SIGNIFICANT CHANGE UP (ref 3.5–5.3)
SODIUM SERPL-SCNC: 141 MMOL/L — SIGNIFICANT CHANGE UP (ref 135–145)

## 2020-07-15 PROCEDURE — 84436 ASSAY OF TOTAL THYROXINE: CPT

## 2020-07-15 PROCEDURE — 82962 GLUCOSE BLOOD TEST: CPT

## 2020-07-15 PROCEDURE — 80053 COMPREHEN METABOLIC PANEL: CPT

## 2020-07-15 PROCEDURE — 83735 ASSAY OF MAGNESIUM: CPT

## 2020-07-15 PROCEDURE — 96360 HYDRATION IV INFUSION INIT: CPT | Mod: XU

## 2020-07-15 PROCEDURE — 83690 ASSAY OF LIPASE: CPT

## 2020-07-15 PROCEDURE — 93005 ELECTROCARDIOGRAM TRACING: CPT

## 2020-07-15 PROCEDURE — 80164 ASSAY DIPROPYLACETIC ACD TOT: CPT

## 2020-07-15 PROCEDURE — 86738 MYCOPLASMA ANTIBODY: CPT

## 2020-07-15 PROCEDURE — 99232 SBSQ HOSP IP/OBS MODERATE 35: CPT | Mod: GC

## 2020-07-15 PROCEDURE — 85027 COMPLETE CBC AUTOMATED: CPT

## 2020-07-15 PROCEDURE — 93010 ELECTROCARDIOGRAM REPORT: CPT

## 2020-07-15 PROCEDURE — 99285 EMERGENCY DEPT VISIT HI MDM: CPT | Mod: 25

## 2020-07-15 PROCEDURE — 84443 ASSAY THYROID STIM HORMONE: CPT

## 2020-07-15 PROCEDURE — 81001 URINALYSIS AUTO W/SCOPE: CPT

## 2020-07-15 PROCEDURE — 74177 CT ABD & PELVIS W/CONTRAST: CPT

## 2020-07-15 PROCEDURE — 80048 BASIC METABOLIC PNL TOTAL CA: CPT

## 2020-07-15 PROCEDURE — 86769 SARS-COV-2 COVID-19 ANTIBODY: CPT

## 2020-07-15 PROCEDURE — 84100 ASSAY OF PHOSPHORUS: CPT

## 2020-07-15 PROCEDURE — 84702 CHORIONIC GONADOTROPIN TEST: CPT

## 2020-07-15 PROCEDURE — 99233 SBSQ HOSP IP/OBS HIGH 50: CPT

## 2020-07-15 RX ORDER — ONDANSETRON 8 MG/1
4 TABLET, FILM COATED ORAL ONCE
Refills: 0 | Status: COMPLETED | OUTPATIENT
Start: 2020-07-15 | End: 2020-07-15

## 2020-07-15 RX ADMIN — ONDANSETRON 4 MILLIGRAM(S): 8 TABLET, FILM COATED ORAL at 02:36

## 2020-07-15 RX ADMIN — Medication 333.33 MILLIGRAM(S): at 01:29

## 2020-07-15 NOTE — DISCHARGE NOTE PROVIDER - CARE PROVIDERS DIRECT ADDRESSES
,[email or web address],DirectAddress_Unknown

## 2020-07-15 NOTE — PROGRESS NOTE ADULT - PROBLEM SELECTOR PLAN 2
-diagnosed at age 9, severe pain after breaking 5th metatarsal, now most recently presenting with intermittent severe pain of LLE and LUE.  -hold depakote and topamax until patient can tolerate PO
-diagnosed at age 9, severe pain after breaking 5th metatarsal, now most recently presenting with intermittent severe pain of LLE and LUE.  -hold depakote and topamax until patient can tolerate PO

## 2020-07-15 NOTE — DISCHARGE NOTE PROVIDER - NSDCCPCAREPLAN_GEN_ALL_CORE_FT
PRINCIPAL DISCHARGE DIAGNOSIS  Diagnosis: Vomiting  Assessment and Plan of Treatment: You were admitted due to vomiting whenever you tried to eat food. For this we gave you sugar water through your IV and started you on erythromycin 250mg IV. After two doses of this medication we decided to stop it due to abdominal pain and bloating. You should follow up with your GI doctor in 1-3 days in order to start domperidone and to receive further evaluation. If you continue to have vomiting and are unable to eat anything, please return to the ED. PRINCIPAL DISCHARGE DIAGNOSIS  Diagnosis: Vomiting  Assessment and Plan of Treatment: You were admitted due to vomiting whenever you tried to eat food. For this we gave you sugar water through your IV and started you on erythromycin 250mg IV. After two doses of this medication we decided to stop it due to abdominal pain and bloating. You should follow up with your GI doctor in 1-3 days in order to start domperidone and to receive further evaluation. If you continue to have vomiting and are unable to eat anything, please return to the ED.      SECONDARY DISCHARGE DIAGNOSES  Diagnosis: Gastroparesis  Assessment and Plan of Treatment: Following your barrium study, and given your symptoms, you were diagnosed with gastroparesis, which is poor motility of your GI system. For this we gave erithromycin, which had negative side effects such as abdominal pain and bloating. Mirtazipine was also considered as an alternative medication. It was determined that you should pursue starting domperidone with your outpatient GI Dr. Wynn instead. Please follow up with GI in 1-3 days as well as your PCP within 2 weeks and return to the ED if you are unable to eat and continue vomiting.

## 2020-07-15 NOTE — DISCHARGE NOTE PROVIDER - PROVIDER TOKENS
PROVIDER:[TOKEN:[[id number]],FOLLOWUP:[1-3 days]] PROVIDER:[TOKEN:[[id number]],FOLLOWUP:[1-3 days]],PROVIDER:[TOKEN:[[id number]],FOLLOWUP:[1 week]]

## 2020-07-15 NOTE — PROGRESS NOTE ADULT - PROBLEM SELECTOR PLAN 4
-diagnosed at age 4, presented with OCD symptoms, prescribe prozac. Since then, asymptomatic  -c/w prozac 60 qdaily
-diagnosed at age 4, presented with OCD symptoms, prescribe prozac. Since then, asymptomatic  -c/w prozac 60 qdaily

## 2020-07-15 NOTE — PROGRESS NOTE ADULT - SUBJECTIVE AND OBJECTIVE BOX
PROGRESS NOTE:   Authoted by Shorty Ponce MD, PGY1 Pager [phone]    Patient is a 19y old  Female who presents with a chief complaint of vomiting (2020 09:28)      SUBJECTIVE / OVERNIGHT EVENTS:     REVIEW OF SYSTEMS:    CONSTITUTIONAL: No weakness, fevers or chills  EYES/ENT: No visual changes;  No vertigo or throat pain   NECK: No pain or stiffness  RESPIRATORY: No cough, wheezing, hemoptysis; No shortness of breath  CARDIOVASCULAR: No chest pain or palpitations  GASTROINTESTINAL: No abdominal or epigastric pain. No nausea, vomiting, or hematemesis; No diarrhea or constipation. No melena or hematochezia.  GENITOURINARY: No dysuria, frequency or hematuria  NEUROLOGICAL: No numbness or weakness  SKIN: No itching, rashes    MEDICATIONS  (STANDING):  dextrose 5% + sodium chloride 0.9%. 1000 milliLiter(s) (100 mL/Hr) IV Continuous <Continuous>  erythromycin   IVPB      erythromycin   IVPB 250 milliGRAM(s) IV Intermittent every 8 hours  FLUoxetine Solution 60 milliGRAM(s) Oral daily    MEDICATIONS  (PRN):      CAPILLARY BLOOD GLUCOSE      POCT Blood Glucose.: 117 mg/dL (2020 11:51)    I&O's Summary      PHYSICAL EXAM:  Vital Signs Last 24 Hrs  T(C): 36.8 (15 Jul 2020 05:02), Max: 36.8 (15 Jul 2020 05:02)  T(F): 98.3 (15 Jul 2020 05:02), Max: 98.3 (15 Jul 2020 05:02)  HR: 73 (15 Jul 2020 05:02) (73 - 93)  BP: 107/73 (15 Jul 2020 05:02) (103/70 - 107/73)  BP(mean): --  RR: 16 (15 Jul 2020 05:02) (16 - 18)  SpO2: 99% (15 Jul 2020 05:02) (99% - 100%)    CONSTITUTIONAL: NAD, well-developed  RESPIRATORY: Normal respiratory effort; lungs are clear to auscultation bilaterally  CARDIOVASCULAR: Regular rate and rhythm, normal S1 and S2, no murmur/rub/gallop; No lower extremity edema; Peripheral pulses are 2+ bilaterally  ABDOMEN: Nontender to palpation, normoactive bowel sounds, no rebound/guarding; No hepatosplenomegaly  MUSCLOSKELETAL: no clubbing or cyanosis of digits; no joint swelling or tenderness to palpation  PSYCH: A+O to person, place, and time; affect appropriate  NEURO: Non-focal, no tremors  SKIN: No rashes    LABS:                        13.5   7.17  )-----------( 364      ( 2020 15:06 )             41.0     07-14    140  |  104  |  <4<L>  ----------------------------<  98  3.7   |  25  |  0.78    Ca    9.5      2020 17:44  Phos  3.5     07-13  Mg     2.0     07-13    TPro  6.9  /  Alb  4.4  /  TBili  0.2  /  DBili  x   /  AST  14  /  ALT  7<L>  /  AlkPhos  75  07-14          Urinalysis Basic - ( 2020 16:50 )    Color: Yellow / Appearance: Clear / S.030 / pH: x  Gluc: x / Ketone: Large  / Bili: Negative / Urobili: 2 mg/dL   Blood: x / Protein: 30 mg/dL / Nitrite: Negative   Leuk Esterase: Negative / RBC: 4 /hpf / WBC 8 /HPF   Sq Epi: x / Non Sq Epi: 4 /hpf / Bacteria: Moderate          RADIOLOGY & ADDITIONAL TESTS:  No new imaging or tests    COORDINATION OF CARE:  Care Discussed with Consultants/Other Providers [Y/N]:  Prior or Outpatient Records Reviewed [Y/N]: PROGRESS NOTE:   Authoted by Shorty Ponce MD, PGY1 Pager [phone]    Patient is a 19y old  Female who presents with a chief complaint of vomiting (2020 09:28)      SUBJECTIVE / OVERNIGHT EVENTS: Patient received first two doses of erythromycin yesterday evening and then overnight. Patient notes that subsequent to receiving the medication she gets crampy abdominal pain and feels extremely bloated, somewhat nauseous. She notes that she was able to keep an ice pop down last night but otherwise had another vomiting episode. As of this morning she again feels well and no longer feels as bloated. She denies fevers, chills, chest pain, SOB, vomiting, diarrhea or problems with urination. LBM was 4 days ago.     REVIEW OF SYSTEMS:    CONSTITUTIONAL: No weakness, fevers or chills  EYES/ENT: No visual changes;  No vertigo or throat pain   NECK: No pain or stiffness  RESPIRATORY: No cough, wheezing, hemoptysis; No shortness of breath  CARDIOVASCULAR: No chest pain or palpitations  GASTROINTESTINAL: No abdominal or epigastric pain. No nausea, vomiting, or hematemesis; No diarrhea or constipation. No melena or hematochezia.  GENITOURINARY: No dysuria, frequency or hematuria  NEUROLOGICAL: No numbness or weakness  SKIN: No itching, rashes    MEDICATIONS  (STANDING):  dextrose 5% + sodium chloride 0.9%. 1000 milliLiter(s) (100 mL/Hr) IV Continuous <Continuous>  erythromycin   IVPB      erythromycin   IVPB 250 milliGRAM(s) IV Intermittent every 8 hours  FLUoxetine Solution 60 milliGRAM(s) Oral daily    MEDICATIONS  (PRN):      CAPILLARY BLOOD GLUCOSE      POCT Blood Glucose.: 117 mg/dL (2020 11:51)    I&O's Summary      PHYSICAL EXAM:  Vital Signs Last 24 Hrs  T(C): 36.8 (15 Jul 2020 05:02), Max: 36.8 (15 Jul 2020 05:02)  T(F): 98.3 (15 Jul 2020 05:02), Max: 98.3 (15 Jul 2020 05:02)  HR: 73 (15 Jul 2020 05:02) (73 - 93)  BP: 107/73 (15 Jul 2020 05:02) (103/70 - 107/73)  BP(mean): --  RR: 16 (15 Jul 2020 05:02) (16 - 18)  SpO2: 99% (15 Jul 2020 05:02) (99% - 100%)    CONSTITUTIONAL: NAD, well-developed  RESPIRATORY: Normal respiratory effort; lungs are clear to auscultation bilaterally  CARDIOVASCULAR: Regular rate and rhythm, normal S1 and S2, no murmur/rub/gallop; No lower extremity edema; Peripheral pulses are 2+ bilaterally  ABDOMEN: Nontender to palpation, normoactive bowel sounds, no rebound/guarding; No hepatosplenomegaly  MUSCLOSKELETAL: no clubbing or cyanosis of digits; no joint swelling or tenderness to palpation  PSYCH: A+O to person, place, and time; affect appropriate  NEURO: Non-focal, no tremors  SKIN: No rashes    LABS:                        13.5   7.17  )-----------( 364      ( 2020 15:06 )             41.0     07-14    140  |  104  |  <4<L>  ----------------------------<  98  3.7   |  25  |  0.78    Ca    9.5      2020 17:44  Phos  3.5     07-13  Mg     2.0     07-13    TPro  6.9  /  Alb  4.4  /  TBili  0.2  /  DBili  x   /  AST  14  /  ALT  7<L>  /  AlkPhos  75  07-14          Urinalysis Basic - ( 2020 16:50 )    Color: Yellow / Appearance: Clear / S.030 / pH: x  Gluc: x / Ketone: Large  / Bili: Negative / Urobili: 2 mg/dL   Blood: x / Protein: 30 mg/dL / Nitrite: Negative   Leuk Esterase: Negative / RBC: 4 /hpf / WBC 8 /HPF   Sq Epi: x / Non Sq Epi: 4 /hpf / Bacteria: Moderate          RADIOLOGY & ADDITIONAL TESTS:  No new imaging or tests    COORDINATION OF CARE:  Care Discussed with Consultants/Other Providers [Y/N]:  Prior or Outpatient Records Reviewed [Y/N]:

## 2020-07-15 NOTE — PROGRESS NOTE ADULT - PROBLEM SELECTOR PLAN 1
-associated originally with dysphagia of both solids and liquids resulting in decreased PO intake leading to starvation ketoacidosis. Currently less dysphagia but patient feels full very quickly and then vomits within an hour after eating or drinking.   -likely 2/2 to gastroparesis given motility study showing poor gastric emptying. (which may be 2/2 to hypothyroidism, autoimmune disease) vs CRPS vs hiatal hernia)  -Started on IV erythromycin, but held for now as per GI recs. given abdominal pain, continued vomiting. Will likely start domperidone once medication arrives and pending EKG  -EKG today  -Will transfer to tele if starting domperidone to monitor for arrhythmias.  -replenish fluids and glucose, c/w 1L infusion of D5 and NaCl  -Small, frequent meals  -Anion gap closed on BMP today.   -TSH, T4 normal  -Will appreciate further GI recs.

## 2020-07-15 NOTE — PROGRESS NOTE ADULT - SUBJECTIVE AND OBJECTIVE BOX
PATIENT: JABARI BERMUDEZ, MRN: [id number]    CHIEF COMPLAINT: Patient is a 19y old  Female who presents with a chief complaint of vomiting (15 Jul 2020 07:18)    INTERVAL HISTORY/OVERNIGHT EVENTS: O/N pt was started on erythromycin. Pt is not tolerating erythromycin well; complains of significant abdominal pain (8-9/10), abdominal bloating after receiving each dose last night, as well as continued vomiting. Pt was able to eat a small ice-pop ~5PM w/out vomiting, but had another episode of NBNB emesis O/N at ~1AM. This AM her abdominal pain and bloating has resolved; she denies N/V/D/C. Last BM was 4d ago. Denies abdominal pain. Denies chest pain or SOB, cough. Has been ambulating minimally. Oriented to person, place, and time. Breathing comfortably on room air.    REVIEW OF SYSTEMS:    Constitutional:     [X ] negative [ ] fevers [ ] chills [ ] weight loss [ ] weight gain  HEENT:                  [X ] negative [ ] dry eyes [ ] eye irritation [ ] postnasal drip [ ] nasal congestion  CV:                         [ X] negative  [ ] chest pain [ ] orthopnea [ ] palpitations [ ] murmur  Resp:                     [ X] negative [ ] cough [ ] shortness of breath [ ] dyspnea [ ] wheezing [ ] sputum [ ] hemoptysis  GI:                          [ X] negative [ ] nausea [ ] vomiting [ ] diarrhea [ ] constipation [ ] abd pain [ ] dysphagia   :                        [X ] negative [ ] dysuria [ ] nocturia [ ] hematuria [ ] increased urinary frequency  Musculoskeletal: [X ] negative [ ] back pain [ ] myalgias [ ] arthralgias [ ] fracture  Skin:                       [X ] negative [ ] rash [ ] itch  Neurological:        [X ] negative [ ] headache [ ] dizziness [ ] syncope [ ] weakness [ ] numbness  Psychiatric:           [X ] negative [ ] anxiety [ ] depression  Endocrine:            [X ] negative [ ] diabetes [ ] thyroid problem  Heme/Lymph:      [X ] negative [ ] anemia [ ] bleeding problem  Allergic/Immune: [X ] negative [ ] itchy eyes [ ] nasal discharge [ ] hives [ ] angioedema    [X ] All other systems negative  [ ] Unable to assess ROS because ________.    MEDICATIONS:  MEDICATIONS  (STANDING):  dextrose 5% + sodium chloride 0.9%. 1000 milliLiter(s) (100 mL/Hr) IV Continuous <Continuous>  erythromycin   IVPB      erythromycin   IVPB 250 milliGRAM(s) IV Intermittent every 8 hours  FLUoxetine Solution 60 milliGRAM(s) Oral daily    MEDICATIONS  (PRN):      ALLERGIES: Allergies    Compazine (Angioedema; Dystonic RXN; Anaphylaxis)  metoclopramide (Angioedema; Dystonic RXN; Anaphylaxis)    Intolerances        OBJECTIVE:  ICU Vital Signs Last 24 Hrs  T(C): 36.8 (15 Jul 2020 05:02), Max: 36.8 (15 Jul 2020 05:02)  T(F): 98.3 (15 Jul 2020 05:02), Max: 98.3 (15 Jul 2020 05:02)  HR: 73 (15 Jul 2020 05:02) (73 - 93)  BP: 107/73 (15 Jul 2020 05:02) (103/70 - 107/73)  BP(mean): --  ABP: --  ABP(mean): --  RR: 16 (15 Jul 2020 05:02) (16 - 18)  SpO2: 99% (15 Jul 2020 05:02) (99% - 100%)      CAPILLARY BLOOD GLUCOSE      POCT Blood Glucose.: 117 mg/dL (2020 11:51)    CAPILLARY BLOOD GLUCOSE      POCT Blood Glucose.: 117 mg/dL (2020 11:51)    I&O's Summary    2020 07:01  -  15 Jul 2020 07:00  --------------------------------------------------------  IN: 1000 mL / OUT: 0 mL / NET: 1000 mL      Daily     Daily Weight in k (15 Jul 2020 07:34)    PHYSICAL EXAMINATION:  General: Comfortable, no acute distress, cooperative with exam.  HEENT: PERRLA, EOMI, moist mucous membranes.  Respiratory: CTAB, normal respiratory effort, no coughing, wheezes, crackles, or rales.  CV: RRR, S1S2, no murmurs, rubs or gallops. No JVD. Distal pulses intact.  Abdominal: Soft, nontender, nondistended, no rebound or guarding, normal bowel sounds.  Neurology: AOx3, no focal neuro defects, MASON x 4.  Extremities: No pitting edema, + Peripheral pulses.  Incisions:   Tubes:    LABS:                          13.5   7.17  )-----------( 364      ( 2020 15:06 )             41.0     07-15    141  |  105  |  <4<L>  ----------------------------<  95  3.8   |  24  |  0.85    Ca    9.3      15 Jul 2020 07:27  Phos  3.5     07-13  Mg     2.0     07-13    TPro  6.9  /  Alb  4.4  /  TBili  0.2  /  DBili  x   /  AST  14  /  ALT  7<L>  /  AlkPhos  75  07-14    LIVER FUNCTIONS - ( 2020 17:44 )  Alb: 4.4 g/dL / Pro: 6.9 g/dL / ALK PHOS: 75 U/L / ALT: 7 U/L / AST: 14 U/L / GGT: x                   Urinalysis Basic - ( 2020 16:50 )    Color: Yellow / Appearance: Clear / S.030 / pH: x  Gluc: x / Ketone: Large  / Bili: Negative / Urobili: 2 mg/dL   Blood: x / Protein: 30 mg/dL / Nitrite: Negative   Leuk Esterase: Negative / RBC: 4 /hpf / WBC 8 /HPF   Sq Epi: x / Non Sq Epi: 4 /hpf / Bacteria: Moderate        TELEMETRY:     EKG:     IMAGING: No new images or tests    ASSESSMENT: 18yo F, history of PANDAS, CRPS, and recently diagnosed gastroparesis, who presenting with starvation ketoacidosis 2/2 vomiting since . Recently admitted (-) for similar presentation with a largely benign GI workup other than gastroparesis    RECOMMENDATIONS:  -Hold IV erythromycin; QTc recently 464  -Small, frequent meals  -Domperidone can be started when the pt acquires it; however would recommend baseline EKG/cardio eval before initiation, given medication's cardiac side effect profile  -If domperidone does not improve her symptoms, consider mirtazapine which has been reported in small series to have benefit, and G-POEM eval as o/p    All recommendations are not final; PENDING ATTENDING EVAL    Prisca Solano MD  Internal Medicine PGY-1  GI Consult Service PATIENT: JABARI BERMUDEZ, MRN: [id number]    CHIEF COMPLAINT: Patient is a 19y old  Female who presents with a chief complaint of vomiting (15 Jul 2020 07:18)    INTERVAL HISTORY/OVERNIGHT EVENTS: O/N pt was started on erythromycin. Pt is not tolerating erythromycin well; complains of significant abdominal pain (8-9/10, diffuse, non-radiating) and abdominal bloating after receiving each dose last night, as well as continued vomiting. Pt was able to eat a small ice-pop ~5PM w/out vomiting, but had another episode of NBNB emesis O/N at ~1AM. This AM her abdominal pain and bloating has resolved; she denies N/D/C. Last BM was 4d ago. Denies abdominal pain. Denies chest pain or SOB, cough. Has been ambulating minimally. Oriented to person, place, and time. Breathing comfortably on room air.    REVIEW OF SYSTEMS:    Constitutional:     [X ] negative [ ] fevers [ ] chills [ ] weight loss [ ] weight gain  HEENT:                  [X ] negative [ ] dry eyes [ ] eye irritation [ ] postnasal drip [ ] nasal congestion  CV:                         [ X] negative  [ ] chest pain [ ] orthopnea [ ] palpitations [ ] murmur  Resp:                     [ X] negative [ ] cough [ ] shortness of breath [ ] dyspnea [ ] wheezing [ ] sputum [ ] hemoptysis  GI:                          [ X] negative [ ] nausea [ ] vomiting [ ] diarrhea [ ] constipation [ ] abd pain [ ] dysphagia   :                        [X ] negative [ ] dysuria [ ] nocturia [ ] hematuria [ ] increased urinary frequency  Musculoskeletal: [X ] negative [ ] back pain [ ] myalgias [ ] arthralgias [ ] fracture  Skin:                       [X ] negative [ ] rash [ ] itch  Neurological:        [X ] negative [ ] headache [ ] dizziness [ ] syncope [ ] weakness [ ] numbness  Psychiatric:           [X ] negative [ ] anxiety [ ] depression  Endocrine:            [X ] negative [ ] diabetes [ ] thyroid problem  Heme/Lymph:      [X ] negative [ ] anemia [ ] bleeding problem  Allergic/Immune: [X ] negative [ ] itchy eyes [ ] nasal discharge [ ] hives [ ] angioedema    [X ] All other systems negative  [ ] Unable to assess ROS because ________.    MEDICATIONS:  MEDICATIONS  (STANDING):  dextrose 5% + sodium chloride 0.9%. 1000 milliLiter(s) (100 mL/Hr) IV Continuous <Continuous>  erythromycin   IVPB      erythromycin   IVPB 250 milliGRAM(s) IV Intermittent every 8 hours  FLUoxetine Solution 60 milliGRAM(s) Oral daily    MEDICATIONS  (PRN):      ALLERGIES: Allergies    Compazine (Angioedema; Dystonic RXN; Anaphylaxis)  metoclopramide (Angioedema; Dystonic RXN; Anaphylaxis)    Intolerances        OBJECTIVE:  ICU Vital Signs Last 24 Hrs  T(C): 36.8 (15 Jul 2020 05:02), Max: 36.8 (15 Jul 2020 05:02)  T(F): 98.3 (15 Jul 2020 05:02), Max: 98.3 (15 Jul 2020 05:02)  HR: 73 (15 Jul 2020 05:02) (73 - 93)  BP: 107/73 (15 Jul 2020 05:02) (103/70 - 107/73)  BP(mean): --  ABP: --  ABP(mean): --  RR: 16 (15 Jul 2020 05:02) (16 - 18)  SpO2: 99% (15 Jul 2020 05:02) (99% - 100%)      CAPILLARY BLOOD GLUCOSE      POCT Blood Glucose.: 117 mg/dL (2020 11:51)    CAPILLARY BLOOD GLUCOSE      POCT Blood Glucose.: 117 mg/dL (2020 11:51)    I&O's Summary    2020 07:01  -  15 Jul 2020 07:00  --------------------------------------------------------  IN: 1000 mL / OUT: 0 mL / NET: 1000 mL      Daily     Daily Weight in k (15 Jul 2020 07:34)    PHYSICAL EXAMINATION:  General: Comfortable, no acute distress, cooperative with exam.  HEENT: PERRLA, EOMI, moist mucous membranes.  Respiratory: CTAB, normal respiratory effort, no coughing, wheezes, crackles, or rales.  CV: RRR, S1S2, no murmurs, rubs or gallops. No JVD. Distal pulses intact.  Abdominal: Soft, nontender, nondistended, no rebound or guarding, normal bowel sounds.  Neurology: AOx3, no focal neuro defects, MASON x 4.  Extremities: No pitting edema, + Peripheral pulses.  Incisions:   Tubes:    LABS:                          13.5   7.17  )-----------( 364      ( 2020 15:06 )             41.0     07-15    141  |  105  |  <4<L>  ----------------------------<  95  3.8   |  24  |  0.85    Ca    9.3      15 Jul 2020 07:27  Phos  3.5     07-13  Mg     2.0     07-13    TPro  6.9  /  Alb  4.4  /  TBili  0.2  /  DBili  x   /  AST  14  /  ALT  7<L>  /  AlkPhos  75  07-14    LIVER FUNCTIONS - ( 2020 17:44 )  Alb: 4.4 g/dL / Pro: 6.9 g/dL / ALK PHOS: 75 U/L / ALT: 7 U/L / AST: 14 U/L / GGT: x                   Urinalysis Basic - ( 2020 16:50 )    Color: Yellow / Appearance: Clear / S.030 / pH: x  Gluc: x / Ketone: Large  / Bili: Negative / Urobili: 2 mg/dL   Blood: x / Protein: 30 mg/dL / Nitrite: Negative   Leuk Esterase: Negative / RBC: 4 /hpf / WBC 8 /HPF   Sq Epi: x / Non Sq Epi: 4 /hpf / Bacteria: Moderate        TELEMETRY:     EKG:     IMAGING: No new images or tests    ASSESSMENT: 18yo F, history of PANDAS, CRPS, and recently diagnosed gastroparesis, who presenting with starvation ketoacidosis 2/2 vomiting since . Recently admitted (-) for similar presentation with a largely benign GI workup other than gastroparesis    RECOMMENDATIONS:  -Hold IV erythromycin; QTc recently 464  -Small, frequent meals  -Domperidone can be started when the pt acquires it; however would recommend baseline EKG/cardio eval before initiation, given medication's cardiac side effect profile  -If domperidone does not improve her symptoms, consider mirtazapine which has been reported in small series to have benefit, and G-POEM eval as o/p    All recommendations are not final; PENDING ATTENDING EVAL    Prisca Solano MD  Internal Medicine PGY-1  GI Consult Service PATIENT: JABARI BERMUDEZ, MRN: [id number]    CHIEF COMPLAINT: Patient is a 19y old  Female who presents with a chief complaint of vomiting (15 Jul 2020 07:18)    INTERVAL HISTORY/OVERNIGHT EVENTS: O/N pt was started on erythromycin. Pt is not tolerating erythromycin well; complains of significant abdominal pain (8-9/10, diffuse, non-radiating) and abdominal bloating after receiving each dose last night, as well as continued vomiting. Pt was able to eat a small ice-pop ~5PM w/out vomiting, but had another episode of NBNB emesis O/N at ~1AM. This AM her abdominal pain and bloating has resolved; she denies N/D/C. Last BM was 4d ago. Denies abdominal pain. Denies chest pain or SOB, cough. Has been ambulating minimally. Oriented to person, place, and time. Breathing comfortably on room air.    REVIEW OF SYSTEMS:    Constitutional:     [X ] negative [ ] fevers [ ] chills [ ] weight loss [ ] weight gain  HEENT:                  [X ] negative [ ] dry eyes [ ] eye irritation [ ] postnasal drip [ ] nasal congestion  CV:                         [ X] negative  [ ] chest pain [ ] orthopnea [ ] palpitations [ ] murmur  Resp:                     [ X] negative [ ] cough [ ] shortness of breath [ ] dyspnea [ ] wheezing [ ] sputum [ ] hemoptysis  GI:                          [ X] negative [ ] nausea [ ] vomiting [ ] diarrhea [ ] constipation [ ] abd pain [ ] dysphagia   :                        [X ] negative [ ] dysuria [ ] nocturia [ ] hematuria [ ] increased urinary frequency  Musculoskeletal: [X ] negative [ ] back pain [ ] myalgias [ ] arthralgias [ ] fracture  Skin:                       [X ] negative [ ] rash [ ] itch  Neurological:        [X ] negative [ ] headache [ ] dizziness [ ] syncope [ ] weakness [ ] numbness  Psychiatric:           [X ] negative [ ] anxiety [ ] depression  Endocrine:            [X ] negative [ ] diabetes [ ] thyroid problem  Heme/Lymph:      [X ] negative [ ] anemia [ ] bleeding problem  Allergic/Immune: [X ] negative [ ] itchy eyes [ ] nasal discharge [ ] hives [ ] angioedema    [X ] All other systems negative  [ ] Unable to assess ROS because ________.    MEDICATIONS:  MEDICATIONS  (STANDING):  dextrose 5% + sodium chloride 0.9%. 1000 milliLiter(s) (100 mL/Hr) IV Continuous <Continuous>  erythromycin   IVPB      erythromycin   IVPB 250 milliGRAM(s) IV Intermittent every 8 hours  FLUoxetine Solution 60 milliGRAM(s) Oral daily    MEDICATIONS  (PRN):      ALLERGIES: Allergies    Compazine (Angioedema; Dystonic RXN; Anaphylaxis)  metoclopramide (Angioedema; Dystonic RXN; Anaphylaxis)    Intolerances        OBJECTIVE:  ICU Vital Signs Last 24 Hrs  T(C): 36.8 (15 Jul 2020 05:02), Max: 36.8 (15 Jul 2020 05:02)  T(F): 98.3 (15 Jul 2020 05:02), Max: 98.3 (15 Jul 2020 05:02)  HR: 73 (15 Jul 2020 05:02) (73 - 93)  BP: 107/73 (15 Jul 2020 05:02) (103/70 - 107/73)  BP(mean): --  ABP: --  ABP(mean): --  RR: 16 (15 Jul 2020 05:02) (16 - 18)  SpO2: 99% (15 Jul 2020 05:02) (99% - 100%)      CAPILLARY BLOOD GLUCOSE      POCT Blood Glucose.: 117 mg/dL (2020 11:51)    CAPILLARY BLOOD GLUCOSE      POCT Blood Glucose.: 117 mg/dL (2020 11:51)    I&O's Summary    2020 07:01  -  15 Jul 2020 07:00  --------------------------------------------------------  IN: 1000 mL / OUT: 0 mL / NET: 1000 mL      Daily     Daily Weight in k (15 Jul 2020 07:34)    PHYSICAL EXAMINATION:  General: Comfortable, no acute distress, cooperative with exam.  HEENT: PERRLA, EOMI, moist mucous membranes.  Respiratory: CTAB, normal respiratory effort, no coughing, wheezes, crackles, or rales.  CV: RRR, S1S2, no murmurs, rubs or gallops. No JVD. Distal pulses intact.  Abdominal: Soft, nontender, nondistended, no rebound or guarding, normal bowel sounds.  Neurology: AOx3, no focal neuro defects, MASON x 4.  Extremities: No pitting edema, + Peripheral pulses.  Incisions:   Tubes:    LABS:                          13.5   7.17  )-----------( 364      ( 2020 15:06 )             41.0     07-15    141  |  105  |  <4<L>  ----------------------------<  95  3.8   |  24  |  0.85    Ca    9.3      15 Jul 2020 07:27  Phos  3.5     07-13  Mg     2.0     07-13    TPro  6.9  /  Alb  4.4  /  TBili  0.2  /  DBili  x   /  AST  14  /  ALT  7<L>  /  AlkPhos  75  07-14    LIVER FUNCTIONS - ( 2020 17:44 )  Alb: 4.4 g/dL / Pro: 6.9 g/dL / ALK PHOS: 75 U/L / ALT: 7 U/L / AST: 14 U/L / GGT: x                   Urinalysis Basic - ( 2020 16:50 )    Color: Yellow / Appearance: Clear / S.030 / pH: x  Gluc: x / Ketone: Large  / Bili: Negative / Urobili: 2 mg/dL   Blood: x / Protein: 30 mg/dL / Nitrite: Negative   Leuk Esterase: Negative / RBC: 4 /hpf / WBC 8 /HPF   Sq Epi: x / Non Sq Epi: 4 /hpf / Bacteria: Moderate        TELEMETRY:     EKG:     IMAGING: No new images or tests

## 2020-07-15 NOTE — DISCHARGE NOTE NURSING/CASE MANAGEMENT/SOCIAL WORK - PATIENT PORTAL LINK FT
You can access the FollowMyHealth Patient Portal offered by [facility] by registering at the following website: [email or web address]. By joining [facility]’s FollowMyHealth portal, you will also be able to view your health information using other applications (apps) compatible with our system.

## 2020-07-15 NOTE — PROGRESS NOTE ADULT - PROBLEM SELECTOR PROBLEM 4
PANDAS (pediatric autoimmune neuropsychiatric disease associated with streptococcal infection)
PANDAS (pediatric autoimmune neuropsychiatric disease associated with streptococcal infection)

## 2020-07-15 NOTE — DISCHARGE NOTE PROVIDER - CARE PROVIDER_API CALL
Shorty Wynn  GASTROENTEROLOGY  [address] [place] [id number]  Phone: [phone]  Fax: [phone]  Follow Up Time: 1-3 days Shorty Wynn  GASTROENTEROLOGY  [address] [place] [id number]  Phone: [phone]  Fax: [phone]  Follow Up Time: 1-3 days    Bimal Bolton  PEDIATRICS  [address] [place] [address]  Phone: [phone]  Fax: [phone]  Follow Up Time: 1 week

## 2020-07-15 NOTE — PROGRESS NOTE ADULT - ATTENDING COMMENTS
Pt seen and examined. Agree with above with additional findings:    # gastroparesis  # nausea/vomiting    - s/p IV erythromycin x 2 with severe cramping/abdominal distension overnight. Now resolved and pt was able to tolerate [place] toast this morning  - plan to start domperidone today (mom will bring it in this afternoon). Given its cardiac toxicity and concern for arrythmia, will transfer to tele floor for close monitoring  - if pt can tolerate po and without cardiac events on tele, dc planning in am    Odalis Mcmahan MD  Division of Hospital Medicine  Cell: [phone]  Pager: [phone]  Office: [phone]
Pt seen and examined. Agree with above with additional findings:    # gastroparesis  # inability to tolerate po intake  # nausea/vomiting    - recent EGD without acute findings. Emptying study with gastroparesis as per outpatient workup with Dr. Wynn  - originally planned to start her on domperidone, which will be delivered to the patient soon (from [facility])  - appreciate GI recs: start on IV erythromycin and monitor QTc  - start small, frequent meals as tolerated    Odalis Mcmahan MD  Division of Hospital Medicine  Cell: [phone]  Pager: [phone]  Office: [phone]

## 2020-07-15 NOTE — PROGRESS NOTE ADULT - ASSESSMENT
18yo F, history of PANDAS, CRPS, and recently diagnosed gastroparesis, who presenting with starvation ketoacidosis 2/2 vomiting since July 3rd. Recently admitted (7/6-7/9) for similar presentation with a largely benign GI workup other than gastroparesis    RECOMMENDATIONS:  -Hold IV erythromycin; QTc recently 464  -Small, frequent meals  -Domperidone can be started when the pt acquires it; however would recommend baseline EKG/cardio eval before initiation, given medication's cardiac side effect profile  -If domperidone does not improve her symptoms, consider mirtazapine which has been reported in small series to have benefit, and G-POEM eval as o/p    Prisca Solano MD  Internal Medicine PGY-1  GI Consult Service 18yo F, history of PANDAS, CRPS, and recently diagnosed gastroparesis, who presenting with starvation ketoacidosis 2/2 vomiting since July 3rd. Recently admitted (7/6-7/9) for similar presentation with a largely benign GI workup other than gastroparesis    RECOMMENDATIONS:  -Hold IV erythromycin as patient reports abdominal pain with it and no relief   -QTc recently 464  -Small, frequent meals  -Domperidone can be started when the pt acquires it; however would recommend baseline EKG/cardio eval before initiation, given medication's cardiac side effect profile - make sure pharmacy knows we are using it off label and that patient knows it's not approved in the [place]  -If domperidone does not improve her symptoms, consider mirtazapine which has been reported in small series to have benefit, and G-POEM eval as o/p    Prisca Solano MD  Internal Medicine PGY-1  GI Consult Service 18yo F, history of PANDAS, CRPS, and recently diagnosed gastroparesis, who presenting with starvation ketoacidosis 2/2 vomiting since July 3rd. Recently admitted (7/6-7/9) for similar presentation with a largely benign GI workup other than gastroparesis    RECOMMENDATIONS:  -Hold IV erythromycin as patient reports abdominal pain with it and no relief   -QTc recently 464  -Small, frequent meals  -Domperidone can be started when the pt acquires it; however would recommend baseline EKG/cardio eval before initiation, given medication's cardiac side effect profile - make sure pharmacy knows we are using it off label and that patient knows it's not approved in the [place]  -If domperidone does not improve her symptoms, consider mirtazapine which has been reported in small series to have benefit, and G-POEM eval as o/p  -If she is able to tolerate PO, outpaitent follow-up with Dr. Tianna Solano MD  Internal Medicine PGY-1  GI Consult Service

## 2020-07-15 NOTE — DISCHARGE NOTE PROVIDER - HOSPITAL COURSE
19 year old woman, history of PANDAS, CRPS (complex regional pain syndrome), gastroparesis, and anxiety, who presents with intractable vomiting since July 3rd. Patient states on July 3rd, she was eating rice and felt as if the rice was suck in her throat, causing her to vomit to relieve the sensation. Since then, patient been unable to tolerate both solids and liquids, reporting persistent sensation of "food getting stuck" in her throat. Patient states she has multiple episodes of emesis daily, which occur whenever she attempts to eat and which aren't associated with nausea. Patient was hospitalized at [facility] from July 6th-9th for similar symptoms, and underwent EGD which revealed a sliding hiatal hernia, but otherwise no esophageal anatomic abnormality; biopsies were benign (including negative for eosonophilic esophagitis); a gastric emptying study revealed gastroparesis (abnormal emptying of liquid and solid). Patient states she was following with outpatient GI Dr. Wynn and was due to start Domperidone, but came to hospital before initiating treatment d/t persistent vomting. In the ED, patient was afebrile at 99.1, HR , -121/55-90, RR 15-19, saturating 99% on RA. Patient had FS of 60, AG 21, +ketones in urine,  CBC and BMP otherwise unremarkable.  CT A/P revealed no gastric obstruction. Patient given 1L bolus NS, followed by 1L infusion of D5 and NaCl. Patient continued with D5 infusion while admitted, which led to correction of starvation ketosis. Patient initially started on Erythromycin 250mg IV TID for two doses. This med gave patient abdominal pain/bloating, so decision was made to stop this medication and attempt to start domperidone. EKG WNL with QTc 430. As pharmacy is not allowed to prescribe domperidone, decision was made to discharge patient once patient was able to tolerate PO intake so that patient could follow up with outpatient GI doctor and begin domperidone. 19 year old woman, history of PANDAS, CRPS (complex regional pain syndrome), gastroparesis, and anxiety, who presents with intractable vomiting since July 3rd. Patient states on July 3rd, she was eating rice and felt as if the rice was suck in her throat, causing her to vomit to relieve the sensation. Since then, patient been unable to tolerate both solids and liquids, reporting persistent sensation of "food getting stuck" in her throat. Patient states she has multiple episodes of emesis daily, which occur whenever she attempts to eat and which aren't associated with nausea. Patient was hospitalized at [facility] from July 6th-9th for similar symptoms, and underwent EGD which revealed a sliding hiatal hernia, but otherwise no esophageal anatomic abnormality; biopsies were benign (including negative for eosonophilic esophagitis); a gastric emptying study revealed gastroparesis (abnormal emptying of liquid and solid). Patient states she was following with outpatient GI Dr. Wynn and was due to start Domperidone, but came to hospital before initiating treatment d/t persistent vomting. In the ED, patient was afebrile at 99.1, HR , -121/55-90, RR 15-19, saturating 99% on RA. Patient had FS of 60, AG 21, +ketones in urine,  CBC and BMP otherwise unremarkable.  CT A/P revealed no gastric obstruction. Patient given 1L bolus NS, followed by 1L infusion of D5 and NaCl. Patient continued with D5 infusion while admitted, which led to correction of starvation ketosis. Patient initially started on Erythromycin 250mg IV TID for two doses. This med gave patient abdominal pain/bloating, so decision was made to stop this medication and attempt to start domperidone. EKG WNL with QTc 430. As pharmacy is not allowed to prescribe domperidone, decision was made to discharge patient once patient was able to tolerate PO intake so that patient could follow up with outpatient GI doctor and begin domperidone. Mirtazapine was considered as well as an option but patient and family preferred to f/u outpatient with her GI in order to pursue domperidone.

## 2020-07-16 LAB
M PNEUMO IGG SER IA-ACNC: 1.93 INDEX — HIGH
M PNEUMO IGG SER IA-ACNC: POSITIVE
M PNEUMO IGM SER-ACNC: 845 UNITS/ML — SIGNIFICANT CHANGE UP
MYCOPLASMA AG SPEC QL: NEGATIVE — SIGNIFICANT CHANGE UP

## 2020-07-23 ENCOUNTER — APPOINTMENT (OUTPATIENT)
Dept: GASTROENTEROLOGY | Facility: CLINIC | Age: 20
End: 2020-07-23
Payer: COMMERCIAL

## 2020-07-23 DIAGNOSIS — K59.00 CONSTIPATION, UNSPECIFIED: ICD-10-CM

## 2020-07-23 PROCEDURE — 99443: CPT

## 2020-07-23 RX ORDER — LINACLOTIDE 72 UG/1
72 CAPSULE, GELATIN COATED ORAL
Qty: 1 | Refills: 5 | Status: ACTIVE | COMMUNITY
Start: 2020-07-23 | End: 1900-01-01

## 2020-07-24 ENCOUNTER — TRANSCRIPTION ENCOUNTER (OUTPATIENT)
Age: 20
End: 2020-07-24

## 2020-07-29 ENCOUNTER — APPOINTMENT (OUTPATIENT)
Dept: INTERNAL MEDICINE | Facility: CLINIC | Age: 20
End: 2020-07-29
Payer: COMMERCIAL

## 2020-07-29 PROCEDURE — 99213 OFFICE O/P EST LOW 20 MIN: CPT | Mod: 95

## 2020-07-31 NOTE — REVIEW OF SYSTEMS
[Fever] : no fever [As Noted in HPI] : as noted in HPI [Chills] : no chills [Negative] : Cardiovascular

## 2020-07-31 NOTE — ASSESSMENT
[FreeTextEntry1] : \par gastroparesis - only slightly improved, in that she can keep down some liquids\par \par can try increasing domperidone to 20 mg aid\par can see gastroparesis center at [facility]\par

## 2020-07-31 NOTE — HISTORY OF PRESENT ILLNESS
[FreeTextEntry1] : \par Taking domperidone tid qac and qhs\par Linzess working\par \par Cannot keep anything besides liquids down\par has vomited ice cream\par \par no pain now, but after meals feels bloated\par \par BMs better - had diarrhea\par \par To get Boost; to try pudding\par Going back to school 8/24\par To see [facility]\par \par \par

## 2020-07-31 NOTE — PHYSICAL EXAM
[General Appearance - Well Nourished] : well nourished [General Appearance - Alert] : alert [General Appearance - In No Acute Distress] : in no acute distress [General Appearance - Well Developed] : well developed [General Appearance - Well-Appearing] : healthy appearing

## 2020-08-03 ENCOUNTER — APPOINTMENT (OUTPATIENT)
Dept: INTERNAL MEDICINE | Facility: CLINIC | Age: 20
End: 2020-08-03
Payer: COMMERCIAL

## 2020-08-03 DIAGNOSIS — K31.84 GASTROPARESIS: ICD-10-CM

## 2020-08-03 PROCEDURE — 99213 OFFICE O/P EST LOW 20 MIN: CPT | Mod: 95

## 2020-08-17 ENCOUNTER — APPOINTMENT (OUTPATIENT)
Dept: INTERNAL MEDICINE | Facility: CLINIC | Age: 20
End: 2020-08-17
Payer: COMMERCIAL

## 2020-08-17 PROCEDURE — 99213 OFFICE O/P EST LOW 20 MIN: CPT | Mod: 95

## 2020-11-25 ENCOUNTER — NON-APPOINTMENT (OUTPATIENT)
Age: 20
End: 2020-11-25

## 2020-12-23 ENCOUNTER — TRANSCRIPTION ENCOUNTER (OUTPATIENT)
Age: 20
End: 2020-12-23

## 2021-02-26 NOTE — ED PROVIDER NOTE - COVID-19 RESULT
NEGATIVE Same Histology In Subsequent Stages Text: The pattern and morphology of the tumor is as described in the first stage.

## 2021-03-15 RX ORDER — ONDANSETRON 8 MG/1
8 TABLET, ORALLY DISINTEGRATING ORAL 3 TIMES DAILY
Qty: 30 | Refills: 5 | Status: ACTIVE | COMMUNITY
Start: 2020-07-13 | End: 1900-01-01

## 2021-03-23 NOTE — PATIENT PROFILE ADULT - DISASTER - NSPRESCRALCFREQ_GEN_A_NUR
Never Sepsis  Left tonsilitis   Fever  Leukocytosis - decreased  ·	dc rocephin, switched to unasyn 3gm IV q12h  ·	awaiting for culture results   ·	going for colonoscopy tomorrow

## 2021-03-25 ENCOUNTER — APPOINTMENT (OUTPATIENT)
Dept: GASTROENTEROLOGY | Facility: CLINIC | Age: 21
End: 2021-03-25

## 2021-06-26 ENCOUNTER — INPATIENT (INPATIENT)
Facility: HOSPITAL | Age: 21
LOS: 0 days | Discharge: HOME | DRG: 641 | End: 2021-06-27
Attending: INTERNAL MEDICINE | Admitting: INTERNAL MEDICINE
Payer: COMMERCIAL

## 2021-06-26 VITALS
RESPIRATION RATE: 16 BRPM | HEART RATE: 76 BPM | HEIGHT: 63 IN | SYSTOLIC BLOOD PRESSURE: 115 MMHG | OXYGEN SATURATION: 99 % | DIASTOLIC BLOOD PRESSURE: 79 MMHG | TEMPERATURE: 98 F | WEIGHT: 110.01 LBS

## 2021-06-26 LAB
BASOPHILS # BLD AUTO: 0.03 K/UL — SIGNIFICANT CHANGE UP (ref 0–0.2)
BASOPHILS NFR BLD AUTO: 0.4 % — SIGNIFICANT CHANGE UP (ref 0–2)
EOSINOPHIL # BLD AUTO: 0.02 K/UL — SIGNIFICANT CHANGE UP (ref 0–0.5)
EOSINOPHIL NFR BLD AUTO: 0.3 % — SIGNIFICANT CHANGE UP (ref 0–6)
HCT VFR BLD CALC: 35.3 % — SIGNIFICANT CHANGE UP (ref 34.5–45)
HGB BLD-MCNC: 12 G/DL — SIGNIFICANT CHANGE UP (ref 11.5–15.5)
IMM GRANULOCYTES NFR BLD AUTO: 0.3 % — SIGNIFICANT CHANGE UP (ref 0–1.5)
LYMPHOCYTES # BLD AUTO: 2.68 K/UL — SIGNIFICANT CHANGE UP (ref 1–3.3)
LYMPHOCYTES # BLD AUTO: 33.7 % — SIGNIFICANT CHANGE UP (ref 13–44)
MCHC RBC-ENTMCNC: 29.2 PG — SIGNIFICANT CHANGE UP (ref 27–34)
MCHC RBC-ENTMCNC: 34 GM/DL — SIGNIFICANT CHANGE UP (ref 32–36)
MCV RBC AUTO: 85.9 FL — SIGNIFICANT CHANGE UP (ref 80–100)
MONOCYTES # BLD AUTO: 0.35 K/UL — SIGNIFICANT CHANGE UP (ref 0–0.9)
MONOCYTES NFR BLD AUTO: 4.4 % — SIGNIFICANT CHANGE UP (ref 2–14)
NEUTROPHILS # BLD AUTO: 4.86 K/UL — SIGNIFICANT CHANGE UP (ref 1.8–7.4)
NEUTROPHILS NFR BLD AUTO: 60.9 % — SIGNIFICANT CHANGE UP (ref 43–77)
NRBC # BLD: 0 /100 WBCS — SIGNIFICANT CHANGE UP (ref 0–0)
PLATELET # BLD AUTO: 324 K/UL — SIGNIFICANT CHANGE UP (ref 150–400)
RBC # BLD: 4.11 M/UL — SIGNIFICANT CHANGE UP (ref 3.8–5.2)
RBC # FLD: 12.3 % — SIGNIFICANT CHANGE UP (ref 10.3–14.5)
WBC # BLD: 7.96 K/UL — SIGNIFICANT CHANGE UP (ref 3.8–10.5)
WBC # FLD AUTO: 7.96 K/UL — SIGNIFICANT CHANGE UP (ref 3.8–10.5)

## 2021-06-26 PROCEDURE — 93010 ELECTROCARDIOGRAM REPORT: CPT

## 2021-06-26 PROCEDURE — 99285 EMERGENCY DEPT VISIT HI MDM: CPT

## 2021-06-26 NOTE — ED PROVIDER NOTE - OBJECTIVE STATEMENT
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 Patient states that she has had chronic gastroparesis and has been unable to tolerate much PO, multiple episodes of NBNB vomiting, states that she is being worked up for ?autoimmune condition and has had various labs drawn as well as an EGD, states that she will be starting IVIG soon but does not know for what. States that she has received IV potassium before.

## 2021-06-26 NOTE — ED PROVIDER NOTE - NS ED ROS FT
ROS:  GEN: (-) fevers/chills, (-) weight loss, (-) fatigue/malaise  HEENT: (-) visual change  NECK: (-) stiffness, (-) swelling  RESP: (-) shortness of breath, (-) cough, (-) sputum  CV: (-) chest pain, (-) palpitations  GI: (-) nausea, (-) vomiting, (-) pain, (-) constipation, (-) diarrhea  : (-) frequency/urgency, (-) hematuria, (-) dysuria, (-) incontinence, (-) discharge  EXT: (-) edema, (-) cyanosis, (+) spasm  ENDO: (-) heat/cold intolerance, (-) polyuria  NEURO: (-) paresthesias, (-) weakness, (-) headache, (-) dizziness, (-) syncope  MSK: (-) muscle pain   SKIN: (-) rash

## 2021-06-26 NOTE — ED ADULT NURSE NOTE - NSIMPLEMENTINTERV_GEN_ALL_ED
Implemented All Universal Safety Interventions:  [place] to call system. Call bell, personal items and telephone within reach. Instruct patient to call for assistance. Room bathroom lighting operational. Non-slip footwear when patient is off stretcher. Physically safe environment: no spills, clutter or unnecessary equipment. Stretcher in lowest position, wheels locked, appropriate side rails in place.

## 2021-06-26 NOTE — ED PROVIDER NOTE - PROGRESS NOTE DETAILS
Patient seen by ICU PA for low potassium and QTc prolongation, can go to telemetry; spoke with Dr. Horton, patient to be admitted, will start repleting lytes, continue monitoring; spoke with patient and family all questions answered.

## 2021-06-26 NOTE — ED ADULT TRIAGE NOTE - DIRECT TO ROOM CARE INITIATED:
ASTHMA ACTION PLAN for Emiliano Negron     : 1953     Date: 12/3/2019  Provider:  Juliano Luis MD  Phone for doctor or clinic: [facility], [facility], [address] [place] [address], [id number] [place] [address]  [phone] 26-Jun-2021 23:13

## 2021-06-26 NOTE — ED PROVIDER NOTE - PHYSICAL EXAMINATION
PHYSICAL EXAMINATION:  VITALS REVIEWED.  VS normal  GENERALIZED APPEARANCE:  Comfortable, no acute distress, ambulating without difficulty  SKIN:  Warm, dry, no cyanosis  HEAD:  No obvious scalp lesions  EYES:  Conjunctiva pink, no icterus  ENMT:  Mucus membranes moist, no stridor  NECK:  Supple, non-tender  CHEST AND RESPIRATORY:  Clear to auscultation B/L, good air entry B/L, equal chest expansion  HEART AND CARDIOVASCULAR:  Regular rate, no obvious murmur  ABDOMEN AND GI:  Soft, non-tender, non-distended.  No rebound, no guarding, no CVA-area tenderness  EXTREMITIES:  No deformity, edema, or calf tenderness  NEURO: AAOx3, gross motor and sensory intact  PSYCH: Normal affect

## 2021-06-26 NOTE — ED PROVIDER NOTE - CLINICAL SUMMARY MEDICAL DECISION MAKING FREE TEXT BOX
20F w/ spasm after Albuterol Inhaler and history of ?autoimmune disorder with gastroparesis unable to tolerate much PO foods; ?electrolyte abnormality (low K+ shifted by Albuterol leading to spasm), r/o other electrolyte abnormalities, r/o QTc prolongation, r/o arrhythmia, ?dystonic reaction (low suspicion); will do labs, imaging, symptomatic treatment, re-eval

## 2021-06-26 NOTE — ED ADULT NURSE NOTE - OBJECTIVE STATEMENT
Pt comes in after having b/l hands cramp after taking her inhaler. Pt says she has had this happen to her hands before but this felt different. She is unsure if the inhaler caused it. She states she has been getting a lot of testing done for autoimmune with no real answers yet. States for the last year she cannot keep food down and will always vomit. No other symptoms at this time.

## 2021-06-26 NOTE — ED PROVIDER NOTE - CRITICAL CARE ATTENDING CONTRIBUTION TO CARE
Evaluation of patient, charting, consulting ICU PA, labs, and monitoring. Evaluation of patient, charting, consulting ICU PA, labs, and monitoring, speaking with and updating family.

## 2021-06-27 ENCOUNTER — TRANSCRIPTION ENCOUNTER (OUTPATIENT)
Age: 21
End: 2021-06-27

## 2021-06-27 VITALS
OXYGEN SATURATION: 99 % | TEMPERATURE: 98 F | HEART RATE: 75 BPM | RESPIRATION RATE: 13 BRPM | SYSTOLIC BLOOD PRESSURE: 97 MMHG | DIASTOLIC BLOOD PRESSURE: 58 MMHG

## 2021-06-27 DIAGNOSIS — E87.6 HYPOKALEMIA: ICD-10-CM

## 2021-06-27 LAB
ALBUMIN SERPL ELPH-MCNC: 3.4 G/DL — SIGNIFICANT CHANGE UP (ref 3.3–5)
ALP SERPL-CCNC: 98 U/L — SIGNIFICANT CHANGE UP (ref 40–120)
ALT FLD-CCNC: 49 U/L — HIGH (ref 10–45)
ANION GAP SERPL CALC-SCNC: 10 MMOL/L — SIGNIFICANT CHANGE UP (ref 5–17)
ANION GAP SERPL CALC-SCNC: 5 MMOL/L — SIGNIFICANT CHANGE UP (ref 5–17)
ANION GAP SERPL CALC-SCNC: 7 MMOL/L — SIGNIFICANT CHANGE UP (ref 5–17)
AST SERPL-CCNC: 35 U/L — SIGNIFICANT CHANGE UP (ref 10–40)
BILIRUB SERPL-MCNC: 0.3 MG/DL — SIGNIFICANT CHANGE UP (ref 0.2–1.2)
BUN SERPL-MCNC: 10 MG/DL — SIGNIFICANT CHANGE UP (ref 7–23)
BUN SERPL-MCNC: 5 MG/DL — LOW (ref 7–23)
BUN SERPL-MCNC: 6 MG/DL — LOW (ref 7–23)
CA-I BLD-SCNC: 1.15 MMOL/L — SIGNIFICANT CHANGE UP (ref 1.12–1.3)
CALCIUM SERPL-MCNC: 8.5 MG/DL — SIGNIFICANT CHANGE UP (ref 8.4–10.5)
CALCIUM SERPL-MCNC: 8.5 MG/DL — SIGNIFICANT CHANGE UP (ref 8.4–10.5)
CALCIUM SERPL-MCNC: 8.6 MG/DL — SIGNIFICANT CHANGE UP (ref 8.4–10.5)
CHLORIDE SERPL-SCNC: 103 MMOL/L — SIGNIFICANT CHANGE UP (ref 96–108)
CHLORIDE SERPL-SCNC: 109 MMOL/L — HIGH (ref 96–108)
CHLORIDE SERPL-SCNC: 112 MMOL/L — HIGH (ref 96–108)
CO2 SERPL-SCNC: 27 MMOL/L — SIGNIFICANT CHANGE UP (ref 22–31)
CO2 SERPL-SCNC: 27 MMOL/L — SIGNIFICANT CHANGE UP (ref 22–31)
CO2 SERPL-SCNC: 31 MMOL/L — SIGNIFICANT CHANGE UP (ref 22–31)
CREAT SERPL-MCNC: 0.69 MG/DL — SIGNIFICANT CHANGE UP (ref 0.5–1.3)
CREAT SERPL-MCNC: 0.88 MG/DL — SIGNIFICANT CHANGE UP (ref 0.5–1.3)
CREAT SERPL-MCNC: 0.89 MG/DL — SIGNIFICANT CHANGE UP (ref 0.5–1.3)
FLUAV AG NPH QL: SIGNIFICANT CHANGE UP COUNTS
FLUBV AG NPH QL: SIGNIFICANT CHANGE UP COUNTS
GLUCOSE SERPL-MCNC: 109 MG/DL — HIGH (ref 70–99)
GLUCOSE SERPL-MCNC: 71 MG/DL — SIGNIFICANT CHANGE UP (ref 70–99)
GLUCOSE SERPL-MCNC: 75 MG/DL — SIGNIFICANT CHANGE UP (ref 70–99)
MAGNESIUM SERPL-MCNC: 1.9 MG/DL — SIGNIFICANT CHANGE UP (ref 1.6–2.6)
MAGNESIUM SERPL-MCNC: 2.4 MG/DL — SIGNIFICANT CHANGE UP (ref 1.6–2.6)
MAGNESIUM SERPL-MCNC: 2.7 MG/DL — HIGH (ref 1.6–2.6)
POTASSIUM SERPL-MCNC: 2.1 MMOL/L — CRITICAL LOW (ref 3.5–5.3)
POTASSIUM SERPL-MCNC: 3.6 MMOL/L — SIGNIFICANT CHANGE UP (ref 3.5–5.3)
POTASSIUM SERPL-MCNC: 4.9 MMOL/L — SIGNIFICANT CHANGE UP (ref 3.5–5.3)
POTASSIUM SERPL-SCNC: 2.1 MMOL/L — CRITICAL LOW (ref 3.5–5.3)
POTASSIUM SERPL-SCNC: 3.6 MMOL/L — SIGNIFICANT CHANGE UP (ref 3.5–5.3)
POTASSIUM SERPL-SCNC: 4.9 MMOL/L — SIGNIFICANT CHANGE UP (ref 3.5–5.3)
PROT SERPL-MCNC: 6.7 G/DL — SIGNIFICANT CHANGE UP (ref 6–8.3)
RSV RNA NPH QL NAA+NON-PROBE: SIGNIFICANT CHANGE UP COUNTS
SARS-COV-2 RNA SPEC QL NAA+PROBE: SIGNIFICANT CHANGE UP COUNTS
SODIUM SERPL-SCNC: 141 MMOL/L — SIGNIFICANT CHANGE UP (ref 135–145)
SODIUM SERPL-SCNC: 144 MMOL/L — SIGNIFICANT CHANGE UP (ref 135–145)
SODIUM SERPL-SCNC: 146 MMOL/L — HIGH (ref 135–145)

## 2021-06-27 PROCEDURE — 80048 BASIC METABOLIC PNL TOTAL CA: CPT

## 2021-06-27 PROCEDURE — 99285 EMERGENCY DEPT VISIT HI MDM: CPT

## 2021-06-27 PROCEDURE — 93010 ELECTROCARDIOGRAM REPORT: CPT | Mod: 77

## 2021-06-27 PROCEDURE — 85025 COMPLETE CBC W/AUTO DIFF WBC: CPT

## 2021-06-27 PROCEDURE — 80053 COMPREHEN METABOLIC PANEL: CPT

## 2021-06-27 PROCEDURE — 82330 ASSAY OF CALCIUM: CPT

## 2021-06-27 PROCEDURE — 36415 COLL VENOUS BLD VENIPUNCTURE: CPT

## 2021-06-27 PROCEDURE — 36000 PLACE NEEDLE IN VEIN: CPT

## 2021-06-27 PROCEDURE — 93005 ELECTROCARDIOGRAM TRACING: CPT

## 2021-06-27 PROCEDURE — 87637 SARSCOV2&INF A&B&RSV AMP PRB: CPT

## 2021-06-27 PROCEDURE — 83735 ASSAY OF MAGNESIUM: CPT

## 2021-06-27 PROCEDURE — 99223 1ST HOSP IP/OBS HIGH 75: CPT

## 2021-06-27 PROCEDURE — 93010 ELECTROCARDIOGRAM REPORT: CPT

## 2021-06-27 RX ORDER — MAGNESIUM SULFATE 500 MG/ML
2 VIAL (ML) INJECTION ONCE
Refills: 0 | Status: COMPLETED | OUTPATIENT
Start: 2021-06-27 | End: 2021-06-27

## 2021-06-27 RX ORDER — FAMOTIDINE 10 MG/ML
20 INJECTION INTRAVENOUS ONCE
Refills: 0 | Status: COMPLETED | OUTPATIENT
Start: 2021-06-27 | End: 2021-06-27

## 2021-06-27 RX ORDER — POTASSIUM CHLORIDE 20 MEQ
10 PACKET (EA) ORAL
Refills: 0 | Status: COMPLETED | OUTPATIENT
Start: 2021-06-27 | End: 2021-06-27

## 2021-06-27 RX ORDER — DIVALPROEX SODIUM 500 MG/1
250 TABLET, DELAYED RELEASE ORAL
Refills: 0 | Status: DISCONTINUED | OUTPATIENT
Start: 2021-06-27 | End: 2021-06-27

## 2021-06-27 RX ORDER — POTASSIUM CHLORIDE 20 MEQ
40 PACKET (EA) ORAL ONCE
Refills: 0 | Status: COMPLETED | OUTPATIENT
Start: 2021-06-27 | End: 2021-06-27

## 2021-06-27 RX ORDER — FLUOXETINE HCL 10 MG
60 CAPSULE ORAL DAILY
Refills: 0 | Status: DISCONTINUED | OUTPATIENT
Start: 2021-06-27 | End: 2021-06-27

## 2021-06-27 RX ADMIN — Medication 100 MILLIEQUIVALENT(S): at 08:54

## 2021-06-27 RX ADMIN — Medication 50 GRAM(S): at 04:29

## 2021-06-27 RX ADMIN — FAMOTIDINE 20 MILLIGRAM(S): 10 INJECTION INTRAVENOUS at 01:13

## 2021-06-27 RX ADMIN — Medication 100 MILLIEQUIVALENT(S): at 03:11

## 2021-06-27 RX ADMIN — Medication 100 MILLIEQUIVALENT(S): at 00:38

## 2021-06-27 RX ADMIN — Medication 100 MILLIEQUIVALENT(S): at 01:49

## 2021-06-27 RX ADMIN — Medication 100 MILLIEQUIVALENT(S): at 07:46

## 2021-06-27 RX ADMIN — FAMOTIDINE 100 MILLIGRAM(S): 10 INJECTION INTRAVENOUS at 00:43

## 2021-06-27 RX ADMIN — Medication 40 MILLIEQUIVALENT(S): at 00:38

## 2021-06-27 RX ADMIN — DIVALPROEX SODIUM 250 MILLIGRAM(S): 500 TABLET, DELAYED RELEASE ORAL at 05:47

## 2021-06-27 NOTE — CONSULT NOTE ADULT - ASSESSMENT
Assessment:     Problem List:    Plan:u Assessment:   Ms. Valdes is a 19 YO F w/pmhx of asthma, gastroparesis, autoimmune disease (being currently worked up) who presents to ED for muscle spasms of her wrists. Patient states that she took albuterol inhaler for her asthma and then noticed she became "dystonic" in her words. Patient's labs reveal hypokalemia of 2.1. Likely, the albuterol caused a transient intracellular shift of potassium, worsening her symptoms. Patient states that in her past admissions to hospitals, she's had potassium replaced since it was low, but not this low. Patient set to receive IVIG 06/30/2021. Patient states she had outpatient EMG which shows nerve degeneration. Unknown what autoimmune disease patient has, but states she has been having symptoms since she was 4. Patient states that about a year ago is when the gastroparesis began, and has been having a very difficult time keeping food down.     Problem List:  1. Symptomatic hypokalemia   2. QTc prolongation   3. Gastroparesis   4. Underlying autoimmune disease     Plan:  #Hypokalemia   - Would recommend aggressively replenishing potassium via PO and IV   - Electrolytes w/mag and phos q4h   - Patient symptomatic from taking albuterol inhaler, would recommend using ipratropium if need relief from bronchoconstriction   - Patient states in prior hospitalizations her K+ was low and replenished, but not this low     #QTc prolongation   - Patient asymptomatic, QTc 590   - Patient denies family history of prolonged QTc  - Consider cardiology consult   - Give patient magnesium   - Strict monitoring of QTc on tele, can lead to polymorphic VT   - Avoid QTc prolonging agents   - Home meds history states she takes azithromycin, do not give, as it can increase QTc    #Gastroparesis   - Patient unable to tolerate PO intake all that well  - States she can take pills though  - Unable to take Reglan, given allergies   - Gastroparesis thought to be associated w/underlying autoimmune disease      #Underlying autoimmune disease   - Unsure what patient has, currently being worked up outpatient   - Set to receive IVIG on 06/30/2021  - Patient has been having symptoms since 4 years old   - Patient had EMG done, showing nerve degeneration, likely gastroparesis from 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 Will require rheumatology consult   - Get in touch w/outpatient providers to receive better understanding of her complicated disease process     - At this time, patient does not require ICU admission given hemodynamic stability. Will recommend strict electrolyte replenishment and serial labs. Continuos monitoring of QTc required. Please feel free to reconsult if need be.     Discussed case w/eICU attending, Dr. Chin and Nocturnist, Dr. Horton

## 2021-06-27 NOTE — H&P ADULT - ASSESSMENT
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   hold azithromycin.   Replete K, Mag  Repeat BMP at 5AM after completion of 3 K runs/Mg and trend BMP.  tele monitoring.     #Gastroparesis  cont clear liquid diet.     #?CIDP  to follow up with neuro as outpt.     IMPROVE VTE Individual Risk Assessment    RISK                                                                Points    [  ] Previous VTE                                                  3    [  ] Thrombophilia                                               2    [  ] Lower limb paralysis                                      2        (unable to hold up >15 seconds)      [  ] Current Cancer                                              2         (within 6 months)    [  ] Immobilization > 24 hrs                                1    [  ] ICU/CCU stay > 24 hours                              1    [  ] Age > 60                                                      1    IMPROVE VTE Score ___0______    IMPROVE Score 0-1: Low Risk, No VTE prophylaxis required for most patients, encourage ambulation.   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   hold azithromycin.   Replete K, Mag  Repeat BMP at 5AM after completion of 3 K runs/Mg and trend BMP. Repeat EKG in am  tele monitoring.     #Gastroparesis  cont clear liquid diet.     #?CIDP  to follow up with neuro as outpt.     IMPROVE VTE Individual Risk Assessment    RISK                                                                Points    [  ] Previous VTE                                                  3    [  ] Thrombophilia                                               2    [  ] Lower limb paralysis                                      2        (unable to hold up >15 seconds)      [  ] Current Cancer                                              2         (within 6 months)    [  ] Immobilization > 24 hrs                                1    [  ] ICU/CCU stay > 24 hours                              1    [  ] Age > 60                                                      1    IMPROVE VTE Score ___0______    IMPROVE Score 0-1: Low Risk, No VTE prophylaxis required for most patients, encourage ambulation.   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   hold azithromycin.   Replete K, Mag  Repeat BMP at 5AM after completion of 3 K runs/Mg and trend BMP. Repeat EKG in am  tele monitoring.     #Gastroparesis  cont clear liquid diet.     #Cyclical vomiting  cont depakote    #OCD  cont prozac    #?CIDP  to follow up with neuro as outpt.     IMPROVE VTE Individual Risk Assessment    RISK                                                                Points    [  ] Previous VTE                                                  3    [  ] Thrombophilia                                               2    [  ] Lower limb paralysis                                      2        (unable to hold up >15 seconds)      [  ] Current Cancer                                              2         (within 6 months)    [  ] Immobilization > 24 hrs                                1    [  ] ICU/CCU stay > 24 hours                              1    [  ] Age > 60                                                      1    IMPROVE VTE Score ___0______    IMPROVE Score 0-1: Low Risk, No VTE prophylaxis required for most patients, encourage ambulation.   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   hold azithromycin.   Replete K, Mag  Repeat BMP at 5AM after completion of 3 K runs/Mg and trend BMP. Repeat EKG in am  tele monitoring.     #Gastroparesis  cont clear liquid diet.     #Cyclical vomiting  cont depakote    #OCD  cont prozac    #?CIDP  to follow up with neuro as outpt.     IMPROVE VTE Individual Risk Assessment    RISK                                                                Points    [  ] Previous VTE                                                  3    [  ] Thrombophilia                                               2    [  ] Lower limb paralysis                                      2        (unable to hold up >15 seconds)      [  ] Current Cancer                                              2         (within 6 months)    [  ] Immobilization > 24 hrs                                1    [  ] ICU/CCU stay > 24 hours                              1    [  ] Age > 60                                                      1    IMPROVE VTE Score ___0______    IMPROVE Score 0-1: Low Risk, No VTE prophylaxis required for most patients, encourage ambulation.   IMPROVE Score 2-3: At risk, pharmacologic VTE prophylaxis is indicated for most patients (in the absence of a contraindication)  IMPROVE Score > or = 4: High Risk, pharmacologic VTE prophylaxis is indicated for most patients (in the absence of a contraindication)

## 2021-06-27 NOTE — H&P ADULT - NSICDXPASTMEDICALHX_GEN_ALL_CORE_FT
PAST MEDICAL HISTORY:  Asthma     Cyclic vomiting syndrome     Gastroparesis     Mycoplasma infection

## 2021-06-27 NOTE — DISCHARGE NOTE PROVIDER - NSDCCPCAREPLAN_GEN_ALL_CORE_FT
PRINCIPAL DISCHARGE DIAGNOSIS  Diagnosis: Hypokalemia  Assessment and Plan of Treatment: Due to cyclical vomiting syndrome. eat more bananas      SECONDARY DISCHARGE DIAGNOSES  Diagnosis: Prolonged QT interval  Assessment and Plan of Treatment: STOP azithromycin

## 2021-06-27 NOTE — H&P ADULT - HISTORY OF PRESENT ILLNESS
20 F hx of PANDAS (resolved), OCD on Prozac, L foot CRPS, cyclical vomiting syndrome felt sec to autoimmune etiology resolved and controlled on depakote, dx with gastroparesis 2020 also felt sec to autoimmune etiology by GI specialists at [facility] with chronic abd distention and vomiting after eating - on liquid diet Ensure mostly. Lost about 60 lbs since diagnosis. Get IVFs at home weekly to avoid dehydration. She was recently diagnosed with 'demyelination' issue after EMG studies with Dr Hernadez, neurologist who felt her GI sxs and neurologic sxs are related and to start IVIG infusions soon. She was just started on azithromycin about a week ago as 'prophylaxis' for chronically elevated Mycoplasma IgM Abs.  She presents with sxs of asthma while at her friend's home and after returning home, she took about 1 and 1/2 albuterol nebulizers with resolution of pulmonary sxs but felt excessively shaky after using her neb and proceeded to develop R wrist/hand carpo spasm which she thought was a dystonic reaction. Spasm resolved after EMS administered IV Benadryl. In ED, afebrile P: 76 BP: 115/79 sat 99% RA. EKG NSR at 84bpm but with prolonged QTc 590. WBC: 8 K: 2.1 ma.9. s/p po KCL 40meq x 1 and getting 1st run of KCl at the moment. She has chronic vomiting but has not increased compared to baseline. No diarrhea. Denied fevers, chills, cough, SOB, CP.   s/p COVID vaccine in 3/2021  mother Cande at bedside [phone]

## 2021-06-27 NOTE — PROVIDER CONTACT NOTE (EICU) - SITUATION
20F w/ PMH asthma, gastroparesis, cyclical vomiting syndrome, mycoplasma infection on azithromycin presents to ED with spasms. + albuterol use for shortness of breath after which she noted that her hands spasmed and cramped. Has a history of dystonic reactions. Chronic gastroparesis causing inability to tolerate much PO with multiple episodes of NBNB emesis. Reports she is being worked up for an underlying autoimmune condition to be starting IVIG soon as outpatient. Also reports chronic hypokalemia at baseline.

## 2021-06-27 NOTE — ED ADULT NURSE REASSESSMENT NOTE - NS ED NURSE REASSESS COMMENT FT1
Pt has mother at bedside. Cov neg. report given to Jorge Luis TRUJILLO. Pt being monitored on tele and was verified with Bakari. Pt given meds. Pt on 2nd run of potassium. Dr. Horton said to give magnesium IV after 3rd run of potassium and to redraw labs at 5am. Safety precautions maintained. Will cont. to follow up

## 2021-06-27 NOTE — H&P ADULT - NSHPLABSRESULTS_GEN_ALL_CORE
12.0   7.96  )-----------( 324      ( 26 Jun 2021 23:30 )             35.3       06-26    141  |  103  |  10  ----------------------------<  109<H>  2.1<LL>   |  31  |  0.88    Ca    8.6      26 Jun 2021 23:30  Mg     1.9     06-26    TPro  6.7  /  Alb  3.4  /  TBili  0.3  /  DBili  x   /  AST  35  /  ALT  49<H>  /  AlkPhos  98  06-26       Magnesium, Serum: 1.9 mg/dL [1.6 - 2.6] (06-26-21 @ 23:30)    LIVER FUNCTIONS - ( 26 Jun 2021 23:30 )  Alb: 3.4 g/dL / Pro: 6.7 g/dL / ALK PHOS: 98 U/L / ALT: 49 U/L / AST: 35 U/L / GGT: x               EKG: personally reviewed. NSR at 84bpm, prolonged QT. QTc: 590

## 2021-06-27 NOTE — CHART NOTE - NSCHARTNOTEFT_GEN_A_CORE
Patient seen and examined in NAD  Repeat labs and EKG WNL.    Patient tolerating same diet from home.    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   hold azithromycin .   Repleted K, Mag in the ED  Repeated BMP and EKG noted  If repeat ekg shows qtc is better, will D/C home  tele monitoring shows no events    #Gastroparesis  cont clear liquid diet.     #Cyclical vomiting  cont depakote    #OCD  cont prozac    #?CIDP  to follow up with neuro as outpt.     DVT PPx- pt can ambulate    Patient is medically stable for discharge home with follow up with her PCP and Neurology

## 2021-06-27 NOTE — H&P ADULT - NSHPPHYSICALEXAM_GEN_ALL_CORE
Vital Signs Last 24 Hrs  T(C): 36.7 (26 Jun 2021 23:16), Max: 36.7 (26 Jun 2021 23:16)  T(F): 98.1 (26 Jun 2021 23:16), Max: 98.1 (26 Jun 2021 23:16)  HR: 76 (26 Jun 2021 23:16) (76 - 76)  BP: 115/79 (26 Jun 2021 23:16) (115/79 - 115/79)  BP(mean): --  RR: 16 (26 Jun 2021 23:16) (16 - 16)  SpO2: 99% (26 Jun 2021 23:16) (99% - 99%)  Daily Height in cm: 160.02 (26 Jun 2021 23:16)    Daily   CAPILLARY BLOOD GLUCOSE        I&O's Summary      GENERAL: NAD  HEAD:  Normocephalic  EYES: EOMI, PERRLA, conjunctiva and sclera clear  ENMT: No tonsillar erythema, exudates, or enlargement; Moist mucous membranes, No lesions  NECK: Supple, No JVD, no bruit, normal thyroid  NERVOUS SYSTEM:  Alert & Oriented X3, Good concentration; grossly  Motor Strength 5/5 B/L upper and lower extremities; DTRs 2+ intact and symmetric  CHEST/LUNG: Clear to auscultation bilaterally; No rales, rhonchi, wheezing, or rubs  HEART: Regular rate and rhythm; No murmurs, rubs, or gallops  ABDOMEN: Soft, Nontender, Nondistended; Bowel sounds present  EXTREMITIES:  2+ Peripheral Pulses, No clubbing, cyanosis, or edema  LYMPH: No lymphadenopathy noted  SKIN: No rashes or lesions

## 2021-06-27 NOTE — DISCHARGE NOTE PROVIDER - HOSPITAL COURSE
Hospital Course  HPI:  20 F hx of PANDAS (resolved), OCD on Prozac, L foot CRPS, cyclical vomiting syndrome felt sec to autoimmune etiology resolved and controlled on depakote, dx with gastroparesis 2020 also felt sec to autoimmune etiology by GI specialists at [facility] with chronic abd distention and vomiting after eating - on liquid diet Ensure mostly. Lost about 60 lbs since diagnosis. Get IVFs at home weekly to avoid dehydration. She was recently diagnosed with 'demyelination' issue after EMG studies with Dr Hernadez, neurologist who felt her GI sxs and neurologic sxs are related and to start IVIG infusions soon. She was just started on azithromycin about a week ago as 'prophylaxis' for chronically elevated Mycoplasma IgM Abs.  She presents with sxs of asthma while at her friend's home and after returning home, she took about 1 and 1/2 albuterol nebulizers with resolution of pulmonary sxs but felt excessively shaky after using her neb and proceeded to develop R wrist/hand carpo spasm which she thought was a dystonic reaction. Spasm resolved after EMS administered IV Benadryl. In ED, afebrile P: 76 BP: 115/79 sat 99% RA. EKG NSR at 84bpm but with prolonged QTc 590. WBC: 8 K: 2.1 ma.9. s/p po KCL 40meq x 1 and getting 1st run of KCl at the moment. She has chronic vomiting but has not increased compared to baseline. No diarrhea. Denied fevers, chills, cough, SOB, CP.   s/p COVID vaccine in 3/2021  mother Cande at bedside [phone] (2021 01:37)    Patient was placed on telemetry with no arrythmia noted. Potassium and magnesium was supplemented. QTC prolongation noted on EKG was corrected. Patient told to stop taking azithromycin, increase electrolyte consumption and see a rheumatologist. Patient is medically stable for D/C home.      You were admitted for abnormal ekg with prolonged qTC prolongation  You were diagnosed with Hypokalemia exacerbated by underlying chronic loss and albuterol with EKG changes exac by azithromycin/prozac  You were treated with potassium and magnesium and monitored on telemetry  You were prescribed the following new medications: STOP azithromycin    You will need to follow up with your primary care physician.    Discharging Provider:  Roseanna Lobo MD  Contact Info: Cell [phone] - Please call with any questions or concerns.    Outpatient Provider: Dr. Ramires

## 2021-06-27 NOTE — PROVIDER CONTACT NOTE (EICU) - ASSESSMENT
acute on chronic hypokalemia may be due to albuterol use.  prolonged qTC without any symptoms: listed medication has pt on azithromycin which can cause qTC prolongation

## 2021-06-27 NOTE — DISCHARGE NOTE PROVIDER - NSDCFUADDINST_GEN_ALL_CORE_FT
STOP azithromycin    Please see rheumatologist via refferall with PCP about Mycoplasma IgM Antibody    Drink liquids with bananas if possible. Increase electrolytes consumption with Gatorade or Powerade

## 2021-06-27 NOTE — H&P ADULT - NSHPREVIEWOFSYSTEMS_GEN_ALL_CORE
CONSTITUTIONAL: No fever, weight loss, or fatigue  EYES: No eye pain, visual disturbances, or discharge  ENMT:  No difficulty hearing, tinnitus, vertigo; No sinus or throat pain  NECK: No pain or stiffness  RESPIRATORY: No cough, wheezing, chills or hemoptysis; No shortness of breath  CARDIOVASCULAR: No chest pain, palpitations, dizziness, or leg swelling  GASTROINTESTINAL: No abdominal or epigastric pain. No nausea, vomiting, or hematemesis; No diarrhea or constipation. No melena or hematochezia.  GENITOURINARY: No dysuria, frequency, hematuria, or incontinence  NEUROLOGICAL: No headaches, memory loss, loss of strength, numbness, or tremors  SKIN: No itching, burning, rashes, or lesions   LYMPH NODES: No enlarged glands  ENDOCRINE: No heat or cold intolerance; No hair loss  MUSCULOSKELETAL: +occasional arthralgias in bl knees and elbows. + CRPS in L leg. ; No muscle, back, or extremity pain  PSYCHIATRIC: No depression, anxiety, mood swings, or difficulty sleeping  HEME/LYMPH: No easy bruising, or bleeding gums  ALLERY AND IMMUNOLOGIC: +occ hives, rashes  Rheum: no hx of szs, photosensitivity, cytopenias, szs, Raynaud's, DVTs/PE, miscarriages, proteinuria (but only when dehydrated)

## 2021-06-27 NOTE — DISCHARGE NOTE PROVIDER - NSDCMRMEDTOKEN_GEN_ALL_CORE_FT
birth control:   Depakote 250 mg oral delayed release tablet: orally every other day  PROzac: 60 milligram(s) orally once a day

## 2021-06-27 NOTE — DISCHARGE NOTE NURSING/CASE MANAGEMENT/SOCIAL WORK - PATIENT PORTAL LINK FT
You can access the FollowMyHealth Patient Portal offered by [facility] by registering at the following website: [email or web address]. By joining [facility]’s FollowMyHealth portal, you will also be able to view your health information using other applications (apps) compatible with our system.

## 2021-06-27 NOTE — CONSULT NOTE ADULT - SUBJECTIVE AND OBJECTIVE BOX
REASON FOR CONSULT: Hypokalemia, QTc prolongation    CONSULT REQUESTED BY: Dr. Vizcaino    Patient is a 20y old  Female who presents with a chief complaint of     BRIEF HOSPITAL COURSE:   Ms. Valdes is a 21 YO F w/pmhx of asthma, gastroparesis, autoimmune disease (being currently worked up) who presents to ED for muscle spasms of her wrists. Patient states that she took albuterol inhaler for her asthma and then noticed she became "dystonic" in her words. Patient's labs reveal hypokalemia of 2.1. Likely, the albuterol caused a transient intracellular shift of potassium, worsening her symptoms. Patient states that in her past admissions to hospitals, she's had potassium replaced since it was low, but not this low. Patient set to receive IVIG 06/30/2021. Patient states she had outpatient EMG which shows nerve degeneration. Unknown what autoimmune disease patient has, but states she has been having symptoms since she was 4. Patient states that about a year ago is when the gastroparesis began, and has been having a very difficult time keeping food down.     Events last 24 hours:   - K+ 2.1. Patient used albuterol inhaler which caused muscle spasms warranting her visit to ED. Likely caused by intracellular K+ shift  - QTc interval noted to be 590   - Asymptomatic on assessment   - Patient denies familial qt prolongation   - Magnesium ordered in ED as well as K+     PAST MEDICAL & SURGICAL HISTORY:  Gastroparesis  Cyclic vomiting syndrome  Asthma  Mycoplasma infection  No significant past surgical history    Allergies  Compazine (Other)  Reglan (Other)  Intolerances    FAMILY HISTORY:    Review of Systems:  CONSTITUTIONAL: No fever, chills, or fatigue  EYES: No eye pain, visual disturbances, or discharge  ENMT:  No difficulty hearing, tinnitus, vertigo; No sinus or throat pain  NECK: No pain or stiffness  RESPIRATORY: No cough, wheezing, chills or hemoptysis; No shortness of breath  CARDIOVASCULAR: No chest pain, palpitations, dizziness, or leg swelling  GASTROINTESTINAL: No abdominal or epigastric pain. No nausea, vomiting, or hematemesis; No diarrhea or constipation. No melena or hematochezia.  GENITOURINARY: No dysuria, frequency, hematuria, or incontinence  NEUROLOGICAL: No headaches, memory loss, loss of strength, numbness, or tremors  SKIN: No itching, burning, rashes, or lesions   MUSCULOSKELETAL: No joint pain or swelling; No muscle, back, or extremity pain  PSYCHIATRIC: No depression, anxiety, mood swings, or difficulty sleeping    Medications:  famotidine  IVPB 20 milliGRAM(s) IV Intermittent once  magnesium sulfate  IVPB 2 Gram(s) IV Intermittent Once  potassium chloride    Tablet ER 40 milliEquivalent(s) Oral once  potassium chloride  10 mEq/100 mL IVPB 10 milliEquivalent(s) IV Intermittent every 1 hour    Vital Signs Last 24 Hrs  T(C): 36.7 (26 Jun 2021 23:16), Max: 36.7 (26 Jun 2021 23:16)  T(F): 98.1 (26 Jun 2021 23:16), Max: 98.1 (26 Jun 2021 23:16)  HR: 76 (26 Jun 2021 23:16) (76 - 76)  BP: 115/79 (26 Jun 2021 23:16) (115/79 - 115/79)  BP(mean): --  RR: 16 (26 Jun 2021 23:16) (16 - 16)  SpO2: 99% (26 Jun 2021 23:16) (99% - 99%)      I&O's Detail    LABS:                        12.0   7.96  )-----------( 324      ( 26 Jun 2021 23:30 )             35.3     06-26    141  |  103  |  10  ----------------------------<  109<H>  2.1<LL>   |  31  |  0.88    Ca    8.6      26 Jun 2021 23:30  Mg     1.9     06-26    TPro  6.7  /  Alb  3.4  /  TBili  0.3  /  DBili  x   /  AST  35  /  ALT  49<H>  /  AlkPhos  98  06-26    Physical Examination:    General: No acute distress.  Alert, oriented, interactive, nonfocal    HEENT: Pupils equal, reactive to light.  Symmetric.    PULM: Clear to auscultation bilaterally, no significant sputum production    CVS: Regular rate and rhythm, no murmurs, rubs, or gallops    ABD: Soft, nondistended, nontender, normoactive bowel sounds, no masses    EXT: No edema, nontender    SKIN: Warm and well perfused, no rashes noted.

## 2021-06-28 ENCOUNTER — NON-APPOINTMENT (OUTPATIENT)
Age: 21
End: 2021-06-28

## 2021-08-10 NOTE — ED PEDIATRIC NURSE NOTE - PMH
Anxiety    PANDAS (pediatric autoimmune neuropsychiatric disease associated with streptococcal infection) 10-Feb-2021

## 2021-11-16 ENCOUNTER — APPOINTMENT (OUTPATIENT)
Dept: ULTRASOUND IMAGING | Facility: CLINIC | Age: 21
End: 2021-11-16
Payer: COMMERCIAL

## 2021-11-16 PROCEDURE — 76700 US EXAM ABDOM COMPLETE: CPT

## 2021-11-19 ENCOUNTER — APPOINTMENT (OUTPATIENT)
Dept: GASTROENTEROLOGY | Facility: CLINIC | Age: 21
End: 2021-11-19
Payer: COMMERCIAL

## 2021-11-19 ENCOUNTER — TRANSCRIPTION ENCOUNTER (OUTPATIENT)
Age: 21
End: 2021-11-19

## 2021-11-19 VITALS
SYSTOLIC BLOOD PRESSURE: 112 MMHG | WEIGHT: 114 LBS | TEMPERATURE: 97.4 F | OXYGEN SATURATION: 99 % | HEART RATE: 93 BPM | DIASTOLIC BLOOD PRESSURE: 76 MMHG

## 2021-11-19 DIAGNOSIS — K80.20 CALCULUS OF GALLBLADDER W/OUT CHOLECYSTITIS W/OUT OBSTRUCTION: ICD-10-CM

## 2021-11-19 PROBLEM — Z00.00 ENCOUNTER FOR PREVENTIVE HEALTH EXAMINATION: Noted: 2021-11-19

## 2021-11-19 PROCEDURE — 99214 OFFICE O/P EST MOD 30 MIN: CPT

## 2021-11-19 RX ORDER — DIVALPROEX SODIUM 250 MG/1
250 TABLET, EXTENDED RELEASE ORAL
Refills: 0 | Status: DISCONTINUED | COMMUNITY
End: 2021-11-19

## 2021-11-23 ENCOUNTER — APPOINTMENT (OUTPATIENT)
Dept: SURGERY | Facility: CLINIC | Age: 21
End: 2021-11-23

## 2022-08-28 RX ORDER — FLUOXETINE HCL 10 MG
60 CAPSULE ORAL
Qty: 0 | Refills: 0 | DISCHARGE

## 2022-08-28 RX ORDER — AZITHROMYCIN 500 MG/1
0 TABLET, FILM COATED ORAL
Qty: 0 | Refills: 0 | DISCHARGE

## 2022-08-28 RX ORDER — DIVALPROEX SODIUM 500 MG/1
0 TABLET, DELAYED RELEASE ORAL
Qty: 0 | Refills: 0 | DISCHARGE

## 2023-05-30 ENCOUNTER — APPOINTMENT (OUTPATIENT)
Dept: OPHTHALMOLOGY | Facility: CLINIC | Age: 23
End: 2023-05-30
Payer: COMMERCIAL

## 2023-05-30 ENCOUNTER — NON-APPOINTMENT (OUTPATIENT)
Age: 23
End: 2023-05-30

## 2023-05-30 PROBLEM — K31.84 GASTROPARESIS: Chronic | Status: ACTIVE | Noted: 2021-06-26

## 2023-05-30 PROBLEM — R11.15 CYCLICAL VOMITING SYNDROME UNRELATED TO MIGRAINE: Chronic | Status: ACTIVE | Noted: 2021-06-26

## 2023-05-30 PROBLEM — J45.909 UNSPECIFIED ASTHMA, UNCOMPLICATED: Chronic | Status: ACTIVE | Noted: 2021-06-26

## 2023-05-30 PROBLEM — A49.3 MYCOPLASMA INFECTION, UNSPECIFIED SITE: Chronic | Status: ACTIVE | Noted: 2021-06-26

## 2023-05-30 PROCEDURE — 92002 INTRM OPH EXAM NEW PATIENT: CPT

## 2023-06-02 ENCOUNTER — APPOINTMENT (OUTPATIENT)
Dept: OPHTHALMOLOGY | Facility: CLINIC | Age: 23
End: 2023-06-02

## 2023-06-05 ENCOUNTER — NON-APPOINTMENT (OUTPATIENT)
Age: 23
End: 2023-06-05

## 2023-06-05 ENCOUNTER — APPOINTMENT (OUTPATIENT)
Dept: OPHTHALMOLOGY | Facility: CLINIC | Age: 23
End: 2023-06-05
Payer: COMMERCIAL

## 2023-06-05 PROCEDURE — 92002 INTRM OPH EXAM NEW PATIENT: CPT

## 2023-06-05 PROCEDURE — 92012 INTRM OPH EXAM EST PATIENT: CPT

## 2023-12-27 NOTE — ED PROVIDER NOTE - CROS ED EYES ALL NEG
Addended by: Malik Chand on: 12/27/2023 01:13 PM     Modules accepted: Orders
Addended by: Mitra Mckinney on: 12/27/2023 01:30 PM     Modules accepted: Orders
negative - No discharge, No redness

## 2024-06-17 NOTE — PROGRESS NOTE ADULT - PROBLEM SELECTOR PROBLEM 4
----- Message from Soo Pina MA sent at 6/14/2024  4:58 PM CDT -----    ----- Message -----  From: Madi Carlson MD  Sent: 6/14/2024   2:46 PM CDT  To: Dorita Henson MD; Aldo Moreno    The LDL-C target is less than 100. Need to review compliance with atorvastatin 40 mg and encourage healthy living habits with heart healthy diet and regular exercise. Can consider doubling the statin dose and repeat lipid panel in 1-3 months. Thanks,     Dr. Carlson  ----- Message -----  From: Cornelius, [facility]  Sent: 6/14/2024   2:39 PM CDT  To: Madi Carlson MD  
Prophylactic measure

## 2025-01-06 NOTE — PROGRESS NOTE ADULT - SUBJECTIVE AND OBJECTIVE BOX
Chief Complaint: Nausea/vomiting  Reason for consult: Nausea/vomiting    Interval Events:   - Patient kept inpatient by hospitalist attending for NM gastric emptying study  - NM gastric emptying study consistent with gastroparesis    Allergies:  Compazine (Angioedema; Dystonic RXN; Anaphylaxis)  metoclopramide (Angioedema; Dystonic RXN; Anaphylaxis)    MEDICATIONS  (STANDING):  lactated ringers. 1000 milliLiter(s) (100 mL/Hr) IV Continuous <Continuous>  valproate sodium IVPB 62.5 milliGRAM(s) IV Intermittent <User Schedule>    MEDICATIONS  (PRN):  ondansetron Injectable 4 milliGRAM(s) IV Push every 6 hours PRN Nausea and/or Vomiting    PMHX/PSHX:  PANDAS (pediatric autoimmune neuropsychiatric disease associated with streptococcal infection)  Anxiety    Family history:  No pertinent family history in first degree relatives    ROS:     General:  No wt loss, fevers, chills, night sweats, fatigue,   Eyes:  Good vision, no reported pain  ENT:  No sore throat, pain, runny nose, dysphagia  CV:  No pain, palpitations, hypo/hypertension  Pulm:  No dyspnea, cough, tachypnea, wheezing  GI:  No pain, + nausea, + vomiting, No diarrhea, No constipation, No weight loss, No fever, No pruritis, No rectal bleeding, No tarry stools, No dysphagia  :  No pain, bleeding, incontinence, nocturia  Muscle:  No pain, weakness  Neuro:  No weakness, tingling, memory problems  Psych:  No fatigue, insomnia, mood problems, depression  Endocrine:  No polyuria, polydipsia, cold/heat intolerance  Heme:  No petechiae, ecchymosis, easy bruisability  Skin:  No rash, tattoos, scars, edema    PHYSICAL EXAM:   Vital Signs:  Vital Signs Last 24 Hrs  T(C): 36.7 (09 Jul 2020 12:59), Max: 37.2 (08 Jul 2020 19:09)  T(F): 98.1 (09 Jul 2020 12:59), Max: 98.9 (08 Jul 2020 19:09)  HR: 100 (09 Jul 2020 12:59) (86 - 100)  BP: 106/71 (09 Jul 2020 12:59) (101/68 - 113/78)  BP(mean): --  RR: 18 (09 Jul 2020 12:59) (18 - 18)  SpO2: 96% (09 Jul 2020 12:59) (96% - 99%)    GENERAL:  No acute distress  HEENT:  Normocephalic/atraumatic,  no scleral icterus  CHEST:  Normal effort, no accessory muscle use  ABDOMEN:  Soft, non-tender, non-distended, normoactive bowel sounds  EXTREMITIES:  No cyanosis, clubbing, or edema  SKIN:  No rash/erythema  NEURO:  Alert and oriented x 3    LABS:    No new labs    Imaging:    < from: NM Gastric Empty Solid/Liquid (07.09.20 @ 12:14) >    EXAM:  NM GASTRIC EMPTY SOLID-LIQUID                                PROCEDURE DATE:  07/09/2020          INTERPRETATION:  RADIOPHARMACEUTICAL: 200 microcuries In-111 DTPA in 300 CC water; 1.05 mCi 99mTc-sulfur colloid in a standardized meal.     CLINICAL INFORMATION: 19 year old female with inability to tolerate liquid solid meal; referred to evaluate for gastroparesis.    MEDICATIONS: Within 24 hours before the test, the patient was not taking any medications that could affect the test results.    TECHNIQUE: Immediately before the beginning of the study, the patient's fasting blood sugar was measured and was 60 mg/dL.  For the liquid phase of the test, dynamic imaging of the abdomen was performed in the [place] projection for 30 minutes following the oral ingestion of 300 water mixed with In-111 DTPA. There was no postprandial vomiting.     After completion of the liquid phase, the solid phase component of the test was performed. The patient consumed 100% of the radiolabeled meal consisting of 4 ounces of cooked egg whites, two slices of toasted white bread with approximately 30 g of strawberry jam, and 4 ounces of water over about 9 minutes. There was no postprandial vomiting. Anterior and posterior one-minute images of the stomach were obtained in the supine position as soon as the patient finished the meal, and were repeated one, two, three and four hours later. After calculation of the geometric mean, and correction for isotope decay, the percent of activity remaining in the stomach at 1,2,3, and 4 hours was determined.    COMPARISON: None    FINDINGS: The T-1/2 emptying of gastric activity, for the liquid phase, was 137.5 minutes (normal: 20 minutes or less).     The results of the solid phase component of the study were:           TIME              % RETENTION                  NORMAL VALUES             0                           100%           1                              87.4                                   37-90%           2                              75.2                               30-60%           3                              57.6                                  -----------------           4                              40.2                                     0-10%        Visual inspection ofthe images unremarkable    IMPRESSION: Abnormal gastric emptying study.     Abnormal liquid phase gastric emptying.    Abnormal solid phase gastric emptying.    Findings compatible with gastroparesis. However, the possibility of gastric outlet obstruction is raised; please correlate clinically and with endoscopy as indicated.                    TRENA VILLASENOR M.D., NUCLEAR MEDICINE ATTENDING  This document has been electronically signed. Jul 9 2020  1:12PM                < end of copied text > PAST SURGICAL HISTORY:  H/O thyroidectomy